# Patient Record
Sex: FEMALE | Race: WHITE | Employment: OTHER | URBAN - METROPOLITAN AREA
[De-identification: names, ages, dates, MRNs, and addresses within clinical notes are randomized per-mention and may not be internally consistent; named-entity substitution may affect disease eponyms.]

---

## 2019-11-11 NOTE — PROGRESS NOTES
Called to pre-assess for [facility] poss with Dr Tyler Tse , Scheduled 11/12/19. No answer & message left.

## 2019-11-12 ENCOUNTER — HOSPITAL ENCOUNTER (OUTPATIENT)
Dept: CARDIAC CATH/INVASIVE PROCEDURES | Age: 74
Discharge: HOME | End: 2019-11-13
Attending: INTERNAL MEDICINE | Admitting: INTERNAL MEDICINE
Payer: MEDICARE

## 2019-11-12 DIAGNOSIS — Z95.820 S/P PERCUTANEOUS TRANSLUMINAL ANGIOPLASTY (PTA) WITH STENT PLACEMENT: Primary | ICD-10-CM

## 2019-11-12 LAB
ACT BLD: 219 SECS (ref 70–128)
ACT BLD: 274 SECS (ref 70–128)
ANION GAP SERPL CALC-SCNC: 6 MMOL/L (ref 7–16)
ATRIAL RATE: 56 BPM
BUN SERPL-MCNC: 22 MG/DL (ref 8–23)
CALCIUM SERPL-MCNC: 9.2 MG/DL (ref 8.3–10.4)
CALCULATED P AXIS, ECG09: 48 DEGREES
CALCULATED R AXIS, ECG10: -29 DEGREES
CALCULATED T AXIS, ECG11: 58 DEGREES
CHLORIDE SERPL-SCNC: 112 MMOL/L (ref 98–107)
CO2 SERPL-SCNC: 25 MMOL/L (ref 21–32)
CREAT SERPL-MCNC: 1.19 MG/DL (ref 0.6–1)
DIAGNOSIS, 93000: NORMAL
ERYTHROCYTE [DISTWIDTH] IN BLOOD BY AUTOMATED COUNT: 15.7 % (ref 11.9–14.6)
GLUCOSE SERPL-MCNC: 87 MG/DL (ref 65–100)
HCT VFR BLD AUTO: 31.9 % (ref 35.8–46.3)
HGB BLD-MCNC: 9.7 G/DL (ref 11.7–15.4)
INR PPP: 1.2
MCH RBC QN AUTO: 26.3 PG (ref 26.1–32.9)
MCHC RBC AUTO-ENTMCNC: 30.4 G/DL (ref 31.4–35)
MCV RBC AUTO: 86.4 FL (ref 79.6–97.8)
NRBC # BLD: 0 K/UL (ref 0–0.2)
P-R INTERVAL, ECG05: 162 MS
PLATELET # BLD AUTO: 293 K/UL (ref 150–450)
PMV BLD AUTO: 10.3 FL (ref 9.4–12.3)
POTASSIUM SERPL-SCNC: 4 MMOL/L (ref 3.5–5.1)
PROTHROMBIN TIME: 15.8 SEC (ref 11.7–14.5)
Q-T INTERVAL, ECG07: 458 MS
QRS DURATION, ECG06: 160 MS
QTC CALCULATION (BEZET), ECG08: 441 MS
RBC # BLD AUTO: 3.69 M/UL (ref 4.05–5.2)
SODIUM SERPL-SCNC: 143 MMOL/L (ref 136–145)
VENTRICULAR RATE, ECG03: 56 BPM
WBC # BLD AUTO: 11.4 K/UL (ref 4.3–11.1)

## 2019-11-12 PROCEDURE — 93458 L HRT ARTERY/VENTRICLE ANGIO: CPT

## 2019-11-12 PROCEDURE — 85347 COAGULATION TIME ACTIVATED: CPT

## 2019-11-12 PROCEDURE — C1753 CATH, INTRAVAS ULTRASOUND: HCPCS

## 2019-11-12 PROCEDURE — 74011000250 HC RX REV CODE- 250: Performed by: NURSE PRACTITIONER

## 2019-11-12 PROCEDURE — 94760 N-INVAS EAR/PLS OXIMETRY 1: CPT

## 2019-11-12 PROCEDURE — C1725 CATH, TRANSLUMIN NON-LASER: HCPCS

## 2019-11-12 PROCEDURE — 99218 HC RM OBSERVATION: CPT

## 2019-11-12 PROCEDURE — 94640 AIRWAY INHALATION TREATMENT: CPT

## 2019-11-12 PROCEDURE — 77030004559 HC CATH ANGI DX SUPT CARD -B

## 2019-11-12 PROCEDURE — 74011250637 HC RX REV CODE- 250/637: Performed by: NURSE PRACTITIONER

## 2019-11-12 PROCEDURE — C1894 INTRO/SHEATH, NON-LASER: HCPCS

## 2019-11-12 PROCEDURE — 77030004534 HC CATH ANGI DX INFN CARD -A

## 2019-11-12 PROCEDURE — 74011636320 HC RX REV CODE- 636/320: Performed by: INTERNAL MEDICINE

## 2019-11-12 PROCEDURE — 93005 ELECTROCARDIOGRAM TRACING: CPT | Performed by: INTERNAL MEDICINE

## 2019-11-12 PROCEDURE — 92928 PRQ TCAT PLMT NTRAC ST 1 LES: CPT

## 2019-11-12 PROCEDURE — 74011000250 HC RX REV CODE- 250: Performed by: INTERNAL MEDICINE

## 2019-11-12 PROCEDURE — C1874 STENT, COATED/COV W/DEL SYS: HCPCS

## 2019-11-12 PROCEDURE — 74011250636 HC RX REV CODE- 250/636: Performed by: INTERNAL MEDICINE

## 2019-11-12 PROCEDURE — 93571 IV DOP VEL&/PRESS C FLO 1ST: CPT

## 2019-11-12 PROCEDURE — C1887 CATHETER, GUIDING: HCPCS

## 2019-11-12 PROCEDURE — 85027 COMPLETE CBC AUTOMATED: CPT

## 2019-11-12 PROCEDURE — C1760 CLOSURE DEV, VASC: HCPCS

## 2019-11-12 PROCEDURE — 77030004558 HC CATH ANGI DX SUPR TORQ CARD -A

## 2019-11-12 PROCEDURE — 77030013687 HC GD NDL BARD -B

## 2019-11-12 PROCEDURE — 99153 MOD SED SAME PHYS/QHP EA: CPT

## 2019-11-12 PROCEDURE — 92978 ENDOLUMINL IVUS OCT C 1ST: CPT

## 2019-11-12 PROCEDURE — 85610 PROTHROMBIN TIME: CPT

## 2019-11-12 PROCEDURE — C1769 GUIDE WIRE: HCPCS

## 2019-11-12 PROCEDURE — 74011250637 HC RX REV CODE- 250/637: Performed by: INTERNAL MEDICINE

## 2019-11-12 PROCEDURE — 77010033678 HC OXYGEN DAILY

## 2019-11-12 PROCEDURE — 99152 MOD SED SAME PHYS/QHP 5/>YRS: CPT

## 2019-11-12 PROCEDURE — 80048 BASIC METABOLIC PNL TOTAL CA: CPT

## 2019-11-12 RX ORDER — BUDESONIDE 0.5 MG/2ML
500 INHALANT ORAL
Status: DISCONTINUED | OUTPATIENT
Start: 2019-11-12 | End: 2019-11-13 | Stop reason: HOSPADM

## 2019-11-12 RX ORDER — ERGOCALCIFEROL 1.25 MG/1
50000 CAPSULE ORAL
COMMUNITY

## 2019-11-12 RX ORDER — GABAPENTIN 300 MG/1
600 CAPSULE ORAL
Status: DISCONTINUED | OUTPATIENT
Start: 2019-11-12 | End: 2019-11-13 | Stop reason: HOSPADM

## 2019-11-12 RX ORDER — MIDAZOLAM HYDROCHLORIDE 1 MG/ML
.5-2 INJECTION, SOLUTION INTRAMUSCULAR; INTRAVENOUS
Status: DISCONTINUED | OUTPATIENT
Start: 2019-11-12 | End: 2019-11-12 | Stop reason: HOSPADM

## 2019-11-12 RX ORDER — PANTOPRAZOLE SODIUM 40 MG/1
40 TABLET, DELAYED RELEASE ORAL
Status: DISCONTINUED | OUTPATIENT
Start: 2019-11-13 | End: 2019-11-13 | Stop reason: HOSPADM

## 2019-11-12 RX ORDER — FLECAINIDE ACETATE 100 MG/1
50 TABLET ORAL 2 TIMES DAILY
Status: DISCONTINUED | OUTPATIENT
Start: 2019-11-12 | End: 2019-11-12

## 2019-11-12 RX ORDER — LIDOCAINE HYDROCHLORIDE 10 MG/ML
10-30 INJECTION INFILTRATION; PERINEURAL ONCE
Status: COMPLETED | OUTPATIENT
Start: 2019-11-12 | End: 2019-11-12

## 2019-11-12 RX ORDER — ACETAMINOPHEN 325 MG/1
650 TABLET ORAL
Status: DISCONTINUED | OUTPATIENT
Start: 2019-11-12 | End: 2019-11-13 | Stop reason: HOSPADM

## 2019-11-12 RX ORDER — ARFORMOTEROL TARTRATE 15 UG/2ML
15 SOLUTION RESPIRATORY (INHALATION)
COMMUNITY
End: 2022-03-30

## 2019-11-12 RX ORDER — BUDESONIDE 0.5 MG/2ML
500 INHALANT ORAL 2 TIMES DAILY
COMMUNITY
End: 2022-03-30

## 2019-11-12 RX ORDER — HEPARIN SODIUM 10000 [USP'U]/ML
2000 INJECTION, SOLUTION INTRAVENOUS; SUBCUTANEOUS
Status: DISCONTINUED | OUTPATIENT
Start: 2019-11-12 | End: 2019-11-12 | Stop reason: HOSPADM

## 2019-11-12 RX ORDER — LEVOTHYROXINE SODIUM 75 UG/1
75 TABLET ORAL
COMMUNITY
End: 2022-03-30

## 2019-11-12 RX ORDER — METOPROLOL SUCCINATE 25 MG/1
25 TABLET, EXTENDED RELEASE ORAL DAILY
Status: DISCONTINUED | OUTPATIENT
Start: 2019-11-13 | End: 2019-11-13 | Stop reason: HOSPADM

## 2019-11-12 RX ORDER — SODIUM CHLORIDE 9 MG/ML
75 INJECTION, SOLUTION INTRAVENOUS CONTINUOUS
Status: DISCONTINUED | OUTPATIENT
Start: 2019-11-12 | End: 2019-11-13 | Stop reason: HOSPADM

## 2019-11-12 RX ORDER — ALBUTEROL SULFATE 90 UG/1
2 AEROSOL, METERED RESPIRATORY (INHALATION)
COMMUNITY

## 2019-11-12 RX ORDER — MONTELUKAST SODIUM 10 MG/1
10 TABLET ORAL
Status: DISCONTINUED | OUTPATIENT
Start: 2019-11-12 | End: 2019-11-13 | Stop reason: HOSPADM

## 2019-11-12 RX ORDER — HEPARIN SODIUM 200 [USP'U]/100ML
3 INJECTION, SOLUTION INTRAVENOUS CONTINUOUS
Status: DISCONTINUED | OUTPATIENT
Start: 2019-11-12 | End: 2019-11-12 | Stop reason: HOSPADM

## 2019-11-12 RX ORDER — ROPINIROLE 0.5 MG/1
0.5 TABLET, FILM COATED ORAL
COMMUNITY
End: 2020-04-15

## 2019-11-12 RX ORDER — ROPINIROLE 0.5 MG/1
0.5 TABLET, FILM COATED ORAL
Status: DISCONTINUED | OUTPATIENT
Start: 2019-11-12 | End: 2019-11-13 | Stop reason: HOSPADM

## 2019-11-12 RX ORDER — ALENDRONATE SODIUM 70 MG/1
70 TABLET ORAL
COMMUNITY
End: 2020-11-04

## 2019-11-12 RX ORDER — SIMVASTATIN 10 MG/1
20 TABLET, FILM COATED ORAL
Status: DISCONTINUED | OUTPATIENT
Start: 2019-11-12 | End: 2019-11-13 | Stop reason: HOSPADM

## 2019-11-12 RX ORDER — LABETALOL HYDROCHLORIDE 5 MG/ML
20 INJECTION, SOLUTION INTRAVENOUS
Status: DISCONTINUED | OUTPATIENT
Start: 2019-11-12 | End: 2019-11-13 | Stop reason: HOSPADM

## 2019-11-12 RX ORDER — BACLOFEN 10 MG/1
10 TABLET ORAL DAILY PRN
Status: DISCONTINUED | OUTPATIENT
Start: 2019-11-12 | End: 2019-11-13 | Stop reason: HOSPADM

## 2019-11-12 RX ORDER — SODIUM CHLORIDE 0.9 % (FLUSH) 0.9 %
5-40 SYRINGE (ML) INJECTION AS NEEDED
Status: DISCONTINUED | OUTPATIENT
Start: 2019-11-12 | End: 2019-11-13 | Stop reason: HOSPADM

## 2019-11-12 RX ORDER — ALBUTEROL SULFATE 0.83 MG/ML
2.5 SOLUTION RESPIRATORY (INHALATION)
Status: DISCONTINUED | OUTPATIENT
Start: 2019-11-12 | End: 2019-11-13 | Stop reason: HOSPADM

## 2019-11-12 RX ORDER — HYDROXYZINE HYDROCHLORIDE 10 MG/1
10 TABLET, FILM COATED ORAL
COMMUNITY
End: 2020-05-22

## 2019-11-12 RX ORDER — ALBUTEROL SULFATE 0.83 MG/ML
2.5 SOLUTION RESPIRATORY (INHALATION)
Status: COMPLETED | OUTPATIENT
Start: 2019-11-12 | End: 2019-11-12

## 2019-11-12 RX ORDER — ONDANSETRON 2 MG/ML
4 INJECTION INTRAMUSCULAR; INTRAVENOUS
Status: DISCONTINUED | OUTPATIENT
Start: 2019-11-12 | End: 2019-11-13 | Stop reason: HOSPADM

## 2019-11-12 RX ORDER — LEVOTHYROXINE SODIUM 75 UG/1
75 TABLET ORAL
Status: DISCONTINUED | OUTPATIENT
Start: 2019-11-13 | End: 2019-11-13 | Stop reason: HOSPADM

## 2019-11-12 RX ORDER — NITROGLYCERIN 0.4 MG/1
0.4 TABLET SUBLINGUAL
Status: DISCONTINUED | OUTPATIENT
Start: 2019-11-12 | End: 2019-11-13 | Stop reason: HOSPADM

## 2019-11-12 RX ORDER — GUAIFENESIN 100 MG/5ML
81-324 LIQUID (ML) ORAL ONCE
Status: COMPLETED | OUTPATIENT
Start: 2019-11-12 | End: 2019-11-12

## 2019-11-12 RX ORDER — MAG HYDROX/ALUMINUM HYD/SIMETH 200-200-20
30 SUSPENSION, ORAL (FINAL DOSE FORM) ORAL
Status: DISCONTINUED | OUTPATIENT
Start: 2019-11-12 | End: 2019-11-13 | Stop reason: HOSPADM

## 2019-11-12 RX ORDER — FENTANYL CITRATE 50 UG/ML
25-75 INJECTION, SOLUTION INTRAMUSCULAR; INTRAVENOUS
Status: DISCONTINUED | OUTPATIENT
Start: 2019-11-12 | End: 2019-11-12 | Stop reason: HOSPADM

## 2019-11-12 RX ORDER — CLOPIDOGREL BISULFATE 75 MG/1
75 TABLET ORAL DAILY
Status: DISCONTINUED | OUTPATIENT
Start: 2019-11-13 | End: 2019-11-13 | Stop reason: HOSPADM

## 2019-11-12 RX ORDER — DABIGATRAN ETEXILATE 150 MG/1
150 CAPSULE ORAL EVERY 12 HOURS
Status: DISCONTINUED | OUTPATIENT
Start: 2019-11-13 | End: 2019-11-13 | Stop reason: HOSPADM

## 2019-11-12 RX ORDER — CLOPIDOGREL BISULFATE 75 MG/1
300 TABLET ORAL ONCE
Status: COMPLETED | OUTPATIENT
Start: 2019-11-12 | End: 2019-11-12

## 2019-11-12 RX ORDER — ALLOPURINOL 100 MG/1
100 TABLET ORAL
COMMUNITY

## 2019-11-12 RX ORDER — SODIUM CHLORIDE 0.9 % (FLUSH) 0.9 %
5-40 SYRINGE (ML) INJECTION EVERY 8 HOURS
Status: DISCONTINUED | OUTPATIENT
Start: 2019-11-12 | End: 2019-11-13 | Stop reason: HOSPADM

## 2019-11-12 RX ADMIN — Medication 5 ML: at 16:38

## 2019-11-12 RX ADMIN — MIDAZOLAM 1 MG: 1 INJECTION INTRAMUSCULAR; INTRAVENOUS at 12:44

## 2019-11-12 RX ADMIN — SIMVASTATIN 20 MG: 10 TABLET, FILM COATED ORAL at 21:41

## 2019-11-12 RX ADMIN — ALBUTEROL SULFATE 2.5 MG: 2.5 SOLUTION RESPIRATORY (INHALATION) at 11:27

## 2019-11-12 RX ADMIN — FENTANYL CITRATE 25 MCG: 50 INJECTION, SOLUTION INTRAMUSCULAR; INTRAVENOUS at 13:03

## 2019-11-12 RX ADMIN — MIDAZOLAM 1 MG: 1 INJECTION INTRAMUSCULAR; INTRAVENOUS at 13:03

## 2019-11-12 RX ADMIN — SODIUM CHLORIDE 75 ML/HR: 900 INJECTION, SOLUTION INTRAVENOUS at 10:14

## 2019-11-12 RX ADMIN — HEPARIN SODIUM 6000 UNITS: 10000 INJECTION INTRAVENOUS; SUBCUTANEOUS at 12:50

## 2019-11-12 RX ADMIN — CLOPIDOGREL BISULFATE 300 MG: 75 TABLET, FILM COATED ORAL at 21:41

## 2019-11-12 RX ADMIN — MONTELUKAST 10 MG: 10 TABLET, FILM COATED ORAL at 21:41

## 2019-11-12 RX ADMIN — MIDAZOLAM 2 MG: 1 INJECTION INTRAMUSCULAR; INTRAVENOUS at 12:34

## 2019-11-12 RX ADMIN — LIDOCAINE HYDROCHLORIDE 8 ML: 10 INJECTION, SOLUTION INFILTRATION; PERINEURAL at 12:45

## 2019-11-12 RX ADMIN — IOPAMIDOL 190 ML: 755 INJECTION, SOLUTION INTRAVENOUS at 13:30

## 2019-11-12 RX ADMIN — MIDAZOLAM 2 MG: 1 INJECTION INTRAMUSCULAR; INTRAVENOUS at 13:29

## 2019-11-12 RX ADMIN — ASPIRIN 81 MG 324 MG: 81 TABLET ORAL at 10:14

## 2019-11-12 RX ADMIN — NITROGLYCERIN 0.2 MCG: 200 INJECTION, SOLUTION INTRAVENOUS at 13:02

## 2019-11-12 RX ADMIN — NITROGLYCERIN 1 INCH: 20 OINTMENT TOPICAL at 13:34

## 2019-11-12 RX ADMIN — HEPARIN SODIUM 4000 UNITS: 10000 INJECTION INTRAVENOUS; SUBCUTANEOUS at 13:00

## 2019-11-12 RX ADMIN — HEPARIN SODIUM 3 UNITS/HR: 200 INJECTION, SOLUTION INTRAVENOUS at 12:19

## 2019-11-12 RX ADMIN — Medication 5 ML: at 21:42

## 2019-11-12 RX ADMIN — LIDOCAINE HYDROCHLORIDE 3 ML: 10 INJECTION, SOLUTION INFILTRATION; PERINEURAL at 12:30

## 2019-11-12 RX ADMIN — FENTANYL CITRATE 50 MCG: 50 INJECTION, SOLUTION INTRAMUSCULAR; INTRAVENOUS at 12:34

## 2019-11-12 RX ADMIN — GABAPENTIN 600 MG: 300 CAPSULE ORAL at 21:41

## 2019-11-12 RX ADMIN — BUDESONIDE 500 MCG: 0.5 INHALANT RESPIRATORY (INHALATION) at 21:20

## 2019-11-12 RX ADMIN — FENTANYL CITRATE 25 MCG: 50 INJECTION, SOLUTION INTRAMUSCULAR; INTRAVENOUS at 12:45

## 2019-11-12 RX ADMIN — HEPARIN SODIUM 2000 UNITS: 10000 INJECTION INTRAVENOUS; SUBCUTANEOUS at 13:12

## 2019-11-12 RX ADMIN — TICAGRELOR 180 MG: 90 TABLET ORAL at 13:23

## 2019-11-12 NOTE — PROGRESS NOTES
TRANSFER - IN REPORT:    Verbal report received from [address] [place] [address] on [facility] being received from [facility] for routine progression of care. Report consisted of patients Situation, Background, Assessment and Recommendations(SBAR). Information from the following report(s) SBAR, Kardex, OR Summary, MAR, Recent Results and Cardiac Rhythm NSR was reviewed. Opportunity for questions and clarification was provided. Assessment completed upon patients arrival to unit and care assumed. Patient received to room 327. Patient connected to monitor and assessment completed. Plan of care reviewed. Patient oriented to room and call light. Patient aware to use call light to communicate any chest pain or needs. Admission skin assessment completed with second RN and reveals the following: Patient has a right radial site s/p LHC. CDI with gauze and tegaderm. Patients skin is pale with scattered bruising on the BUE. Sacrum is free of any breakdown. Heels are free of bogginess. No other skin abnormalities noted.

## 2019-11-12 NOTE — PROGRESS NOTES
Patient received to [address] [place] [address] room # 4 via wheelchair from [facility]. Patient scheduled for [facility] today with Dr Hendricks Baumgarten. Procedure reviewed & questions answered, voiced good understanding consent obtained & placed on chart. All medications and medical history reviewed. Will prep patient per orders. Patient & family updated on plan of care. The patient has a fraility score of 4-VULNERABLE, based on patient does require wheelchair assistance to prep room, patient A&Ox3.

## 2019-11-12 NOTE — PROGRESS NOTES
Bedside and Verbal shift change report given to Sara (oncoming nurse) by self (offgoing nurse). Report included the following information SBAR, Kardex, MAR and Recent Results.      Right groin site CDI

## 2019-11-12 NOTE — PROCEDURES
[facility]  CARDIAC CATH    Name:  Nithya Mcguire  MR#:  [id number]  :  1945  ACCOUNT #:  [de-identified]  DATE OF SERVICE:  2019    PROCEDURES PERFORMED:  1. Left heart catheterization, selective coronary arteriography, and left ventriculogram via the right femoral approach. 2.  Attempted right radial access. 3.  iFR of LAD. 4.  PCI and stenting of the LAD. 5.  Intravascular ultrasound of the LAD following stent placement. PREOPERATIVE DIAGNOSES:  Typical angina with worsening symptoms. Abnormal stress test considered an intermediate risk stress test.    POSTOPERATIVE DIAGNOSIS:  Obstructive coronary artery disease. SURGEON:  Leopoldo Manges, MD    ASSISTANT:  None. ESTIMATED BLOOD LOSS:  Less than 5 mL. SPECIMENS REMOVED:  None. COMPLICATIONS:  None. IMPLANTS:  Resolute [place] drug-eluting stent. ANESTHESIA:  The patient received moderate supervised conscious sedation with a total of 4 mg of Versed and 100 mcg of fentanyl. Sedation was administered by Jai Malcolm RN, under my supervision. Sedation start time was 1235 with a procedure completion time of 1330. FINDINGS:  As below. TECHNICAL FACTORS:  After informed consent was obtained, the patient was brought to the cardiac catheterization lab. The right radial artery was prepped and draped in the usual sterile fashion. I was able to enter the right radial artery but could not get a wire the pass despite two different punctures. A small-caliber radial artery. At this point, it was felt best to proceed with femoral approach. The right femoral artery was prepped and draped in usual sterile fashion. Using [facility] ultrasound, the right common femoral artery was identified. A 6-Vietnamese arterial sheath was placed without difficulty.   A JL4 catheter was used to select and engage the ostium of the left main coronary artery and a 3DRC catheter was used to select and engage the ostium of the right coronary artery. Selective injection verification was performed. A pigtail catheter was used to cross the aortic valve and the left ventricle. Hemodynamic measurements and left ventriculogram were obtained. Left ventricular aortic pressure gradient was obtained by pullback technique. At the conclusion of the diagnostic procedure, the patient was referred for [facility] and later PCI of the LAD. Please see procedure notes for details. CONTRAST:  Isovue 190. HEMODYNAMIC RESULTS:  1. Aortic pressure was 167/78 with a mean of 112. She did become hypertensive during the procedure requiring intracoronary nitroglycerin. 2.  Left ventricular end-diastolic pressure is 29.  3.  There is a 10-mm peak-to-peak gradient across the aortic valve consistent with mild aortic stenosis. ANGIOGRAPHIC RESULTS:  1. Left main coronary artery:  Large-caliber vessel. Angiographically normal.  2.  LAD:  Medium to large-caliber nondominant vessel. Contains a 30% ostial stenosis. The ongoing vessel is small caliber but patent. 3.  First obtuse marginal:  It is a medium-caliber vessel. Patent. 4. LAD:  It is a medium to large-caliber vessel. Heavily calcified in the proximal segment with an eccentric 70% stenosis. Distal vessel is patent. 5.  First and second diagonal arteries:  Small-caliber vessels. Patent. 6.  Right coronary artery: It is a large-caliber, dominant vessel. A 20% mid and 20% distal stenosis. 7.  Right PDA:  Medium-caliber vessel. A 30% mid stenosis. 8.  Right posterolateral branch:  Medium-caliber vessel. Patent. 9.  Left ventriculogram performed in VIDALES projection shows low-normal left ventricular systolic function. EF 50% to 55%. No focal segmental wall motion abnormalities. Aortic root is nondilated. CONCLUSION:  1. Eccentric proximal LAD stenosis. 2.  Mild stenosis in the circumflex and right coronary artery. 3.  Mild aortic stenosis.   4.  Low-normal left ventricular systolic function. 5.  Poorly controlled hypertension with elevated left ventricular end-diastolic pressure. PLAN:  Proceed with hemodynamic assessment of the LAD. PERCUTANEOUS CORONARY INTERVENTION NOTE:  PROCEDURE #1:  iFR of LAD. TECHNICAL FACTORS:  The patient was given intravenous heparin to maintain ACT greater than 300. XB3.0 guide was used to select and engage the ostium of the left main coronary artery. A Verrata pressure wire was advanced into the ostium of the left main coronary artery and appropriately normalized. The patient was given 200 mcg of intracoronary nitroglycerin and the iFR was placed in the mid to distal LAD.  iFR was 0.79-0.80 indicating hemodynamically significant stenosis. Pullback showed that there was diffuse hemodynamic significance across the proximal LAD. Based on this, it was felt PCI was required. PROCEDURE #2:  PCI of the proximal LAD, pre-stenosis 70%, post stenosis 0%. TECHNICAL FACTORS:  A [place] wire was placed in the distal LAD. The lesion was predilated with a 3.0 x 20-mm NC Quantum [place] balloon to 16 atmospheres. Following this, a Resolute [place] 3.5 x 38-mm drug-eluting stent was positioned and deployed to 18 atmospheres. At this point, a 3.5 x 15-mm NC Quantum [place] was positioned and deployed to 20 atmospheres for appropriate post dilatation. Following post dilatation, intravascular ultrasound was performed that showed there is no evidence of dissection pre or post stent. Throughout the stented segment, there is good apposition with no incomplete apposition seen. PROCEDURE #3:  Angio-Seal vascular closure. TECHNICAL FACTORS:  A femoral arteriogram was performed via the existing 6-Nigerian sheath. This showed the sheath was contained in the right common femoral artery. A 6-Nigerian Angio-Seal vascular closure device was deployed by standard technique with good hemostasis. CONCLUSIONS:  1.   Successful iFR of the proximal LAD with iFR of 0.79-0.80 indicating hemodynamically significant stenosis of proximal LAD. 2.  Successful PCI and stenting of proximal LAD with a Resolute [place] 3.5 x 38-mm drug-eluting stent. 3.  Successful intravascular ultrasound post stenting of the proximal LAD. 4.  Successful 6-[place] Irish Angio-Seal vascular closure device deployment with good hemostasis. PLAN:  Monitor the patient closely in the postprocedure setting. She is admitted to observation given her significant hypertension during the procedure to monitor for any complications at her arteriotomy site.       Karan Rudolph MD      [id number]  D:  11/12/2019 14:30  T:  11/12/2019 15:15  JOB #:  [id number]  CC:  Brian Vann MD       [facility] Cardiology

## 2019-11-12 NOTE — PROGRESS NOTES
TRANSFER - IN REPORT:    Verbal report received from bonny RODRIGEZ(name) on Gabby Nielson  being received from cath lab(unit) for routine progression of care      Report consisted of patients Situation, Background, Assessment and   Recommendations(SBAR). Information from the following report(s) Procedure Summary was reviewed with the receiving nurse. Opportunity for questions and clarification was provided. Assessment completed upon patients arrival to unit and care assumed.

## 2019-11-12 NOTE — PROCEDURES
Brief Cardiac Procedure Note    Patient: Talia Pulido MRN: [id number]  SSN: [id number]    YOB: 1945  Age: 72 y.o. Sex: female      Date of Procedure: 11/12/2019     Pre-procedure Diagnosis: Typical Angina    Post-procedure Diagnosis: Coronary Artery Disease    Procedure: Left Heart Catheterization with Percutaneous Coronary Intervention    Brief Description of Procedure: As above    Performed By: Caren Pires MD     Assistants: None    Anesthesia: Moderate Sedation    Estimated Blood Loss: Less than 10 mL      Specimens: None    Implants: None    Findings: 70% pLAD. IFR 0.80. PCI with Nikhil 3.5 x 38 mm ISABELL. Post stent IVUS good. Complications: None, RFA. Mary Oh observation due to htn. Recommendations: Continue medical therapy.     Signed By: Caren Pires MD     November 12, 2019

## 2019-11-12 NOTE — PROGRESS NOTES
Patient transported to room 327. Right groin assessed with Ousmane Alvarado RN. No bleeding or hematoma.

## 2019-11-12 NOTE — PROGRESS NOTES
TRANSFER - OUT REPORT:    Verbal report given to Mala RN(name) on Tony Rich  being transferred to tele 327(unit) for routine progression of care       Report consisted of patients Situation, Background, Assessment and   Recommendations(SBAR). Information from the following report(s) Procedure Summary was reviewed with the receiving nurse. Lines:   Peripheral IV 11/12/19 Left;Posterior Hand (Active)        Opportunity for questions and clarification was provided.       Patient transported with:   Registered Nurse

## 2019-11-12 NOTE — PROGRESS NOTES
TRANSFER - OUT REPORT:    Verbal report given to ivory rn(name) on Gabby Hardy  being transferred to cpru(unit) for routine progression of care       Report consisted of patients Situation, Background, Assessment and   Recommendations(SBAR). Information from the following report(s) SBAR was reviewed with the receiving nurse. Opportunity for questions and clarification was provided. Procedure: [facility]    Finding Summary: stent to lad(cath/pci/pacer settings)  Location: right groin    Closure Device: angioseal(yes/no/description)  Post Site Assessment: no bleeding or hematoma     Pre Procedure Meds:(if any)      Intra Procedure Meds:    Versed: 6mg  Fentanyl: 100mcg  Heparin: 14,000 units  Angiomax Stop Time: na  Reopro:  na  Integrelin: na  Antiplatelet: brilinta 436BI             Peripheral IV 11/12/19 Left;Posterior Hand (Active)        Post-Procedure Site Assessment (1)  Wound Type: Catheter entry/exit  Location: Groin  Orientation : Right  Closure Device: Tegaderm, Angioseal  Site Assessment: No bleeding, No hematoma, Dry/intact                       is allergic to amoxicillin; codeine; hydralazine; and macrobid [nitrofurantoin monohyd/m-cryst].     Past Medical History:   Diagnosis Date    Abnormal stress test 2006    Arthritis     AS (aortic stenosis)     Asthma 11/12/2015    Atrial septal aneurysm     CAD (coronary artery disease)     Chest pain 11/12/2015    Chronic kidney disease     Chronic venous insufficiency     COPD (chronic obstructive pulmonary disease) ([facility] 11/12/2015    Diabetes ([facility]     Essential hypertension 11/12/2015    History of left heart catheterization (LHC) 1990s     per pt - normal    Hyperlipidemia 11/12/2015    Mixed hyperlipidemia 11/12/2015    PAF (paroxysmal atrial fibrillation) (HCC) 11/12/2015    Palpitations 11/12/2015    Renal insufficiency     Sleep apnea 11/12/2015    w CPAP    Thyroid disease      Visit Vitals  /81   Pulse 62 Resp 16   Ht 5' 3\" (1.6 m)   Wt 116.6 kg (257 lb)   SpO2 95%   BMI 45.53 kg/m²

## 2019-11-12 NOTE — PROGRESS NOTES
Care Management Interventions  PCP Verified by CM: Yes  Last Visit to PCP: 10/29/19  Mode of Transport at Discharge: Other (see comment)(Jaiden Hardy Spouse [phone] )  Transition of Care Consult (CM Consult): Discharge Planning  Discharge Durable Medical Equipment: No  Physical Therapy Consult: No  Occupational Therapy Consult: No  Speech Therapy Consult: No  Current Support Network: Lives with Spouse  Confirm Follow Up Transport: Family  Plan discussed with Pt/Family/Caregiver: Yes  Freedom of Choice Offered: Yes  Discharge Location  Discharge Placement: Home    Pt admitted to 3rd floor [facility] for precordial pain . CM met with pt to discuss CM needs & DCP. Pt is A&Ox4. Pt is indep at home with all ADLS. Pt lives with spouse, grandson, and son. Pt has cane, walker, SC, cpap, & nebulizer; no further DME needs. Pt has no difficulty with obtaining medications or transport. DCP home with spouse. No further needs noted.  CM to continue to monitor.  '

## 2019-11-13 VITALS
DIASTOLIC BLOOD PRESSURE: 77 MMHG | RESPIRATION RATE: 18 BRPM | WEIGHT: 259.2 LBS | OXYGEN SATURATION: 96 % | BODY MASS INDEX: 45.93 KG/M2 | TEMPERATURE: 98.4 F | SYSTOLIC BLOOD PRESSURE: 156 MMHG | HEIGHT: 63 IN | HEART RATE: 63 BPM

## 2019-11-13 LAB
ANION GAP SERPL CALC-SCNC: 6 MMOL/L (ref 7–16)
ATRIAL RATE: 62 BPM
ATRIAL RATE: 81 BPM
BASOPHILS # BLD: 0.1 K/UL (ref 0–0.2)
BASOPHILS NFR BLD: 0 % (ref 0–2)
BUN SERPL-MCNC: 19 MG/DL (ref 8–23)
CALCIUM SERPL-MCNC: 8.7 MG/DL (ref 8.3–10.4)
CALCULATED P AXIS, ECG09: 116 DEGREES
CALCULATED P AXIS, ECG09: 65 DEGREES
CALCULATED R AXIS, ECG10: -12 DEGREES
CALCULATED R AXIS, ECG10: -34 DEGREES
CALCULATED T AXIS, ECG11: 137 DEGREES
CALCULATED T AXIS, ECG11: 70 DEGREES
CHLORIDE SERPL-SCNC: 109 MMOL/L (ref 98–107)
CHOLEST SERPL-MCNC: 99 MG/DL
CO2 SERPL-SCNC: 26 MMOL/L (ref 21–32)
CREAT SERPL-MCNC: 1.21 MG/DL (ref 0.6–1)
DIAGNOSIS, 93000: NORMAL
DIAGNOSIS, 93000: NORMAL
DIFFERENTIAL METHOD BLD: ABNORMAL
EOSINOPHIL # BLD: 0.5 K/UL (ref 0–0.8)
EOSINOPHIL NFR BLD: 4 % (ref 0.5–7.8)
ERYTHROCYTE [DISTWIDTH] IN BLOOD BY AUTOMATED COUNT: 15.5 % (ref 11.9–14.6)
GLUCOSE SERPL-MCNC: 99 MG/DL (ref 65–100)
HCT VFR BLD AUTO: 31.5 % (ref 35.8–46.3)
HDLC SERPL-MCNC: 50 MG/DL (ref 40–60)
HDLC SERPL: 2 {RATIO}
HGB BLD-MCNC: 9.6 G/DL (ref 11.7–15.4)
IMM GRANULOCYTES # BLD AUTO: 0.1 K/UL (ref 0–0.5)
IMM GRANULOCYTES NFR BLD AUTO: 0 % (ref 0–5)
LDLC SERPL CALC-MCNC: 34.4 MG/DL
LIPID PROFILE,FLP: NORMAL
LYMPHOCYTES # BLD: 2.3 K/UL (ref 0.5–4.6)
LYMPHOCYTES NFR BLD: 19 % (ref 13–44)
MAGNESIUM SERPL-MCNC: 1.8 MG/DL (ref 1.8–2.4)
MCH RBC QN AUTO: 25.7 PG (ref 26.1–32.9)
MCHC RBC AUTO-ENTMCNC: 30.5 G/DL (ref 31.4–35)
MCV RBC AUTO: 84.2 FL (ref 79.6–97.8)
MONOCYTES # BLD: 0.7 K/UL (ref 0.1–1.3)
MONOCYTES NFR BLD: 6 % (ref 4–12)
NEUTS SEG # BLD: 8.4 K/UL (ref 1.7–8.2)
NEUTS SEG NFR BLD: 70 % (ref 43–78)
NRBC # BLD: 0 K/UL (ref 0–0.2)
P-R INTERVAL, ECG05: 156 MS
P-R INTERVAL, ECG05: 166 MS
PLATELET # BLD AUTO: 286 K/UL (ref 150–450)
PMV BLD AUTO: 10.2 FL (ref 9.4–12.3)
POTASSIUM SERPL-SCNC: 4.2 MMOL/L (ref 3.5–5.1)
Q-T INTERVAL, ECG07: 416 MS
Q-T INTERVAL, ECG07: 434 MS
QRS DURATION, ECG06: 148 MS
QRS DURATION, ECG06: 160 MS
QTC CALCULATION (BEZET), ECG08: 440 MS
QTC CALCULATION (BEZET), ECG08: 483 MS
RBC # BLD AUTO: 3.74 M/UL (ref 4.05–5.2)
SODIUM SERPL-SCNC: 141 MMOL/L (ref 136–145)
TRIGL SERPL-MCNC: 73 MG/DL (ref 35–150)
VENTRICULAR RATE, ECG03: 62 BPM
VENTRICULAR RATE, ECG03: 81 BPM
VLDLC SERPL CALC-MCNC: 14.6 MG/DL (ref 6–23)
WBC # BLD AUTO: 12 K/UL (ref 4.3–11.1)

## 2019-11-13 PROCEDURE — 94760 N-INVAS EAR/PLS OXIMETRY 1: CPT

## 2019-11-13 PROCEDURE — 85025 COMPLETE CBC W/AUTO DIFF WBC: CPT

## 2019-11-13 PROCEDURE — 83735 ASSAY OF MAGNESIUM: CPT

## 2019-11-13 PROCEDURE — 74011250637 HC RX REV CODE- 250/637: Performed by: INTERNAL MEDICINE

## 2019-11-13 PROCEDURE — 93005 ELECTROCARDIOGRAM TRACING: CPT | Performed by: INTERNAL MEDICINE

## 2019-11-13 PROCEDURE — 74011000250 HC RX REV CODE- 250: Performed by: NURSE PRACTITIONER

## 2019-11-13 PROCEDURE — 74011250637 HC RX REV CODE- 250/637: Performed by: NURSE PRACTITIONER

## 2019-11-13 PROCEDURE — 80048 BASIC METABOLIC PNL TOTAL CA: CPT

## 2019-11-13 PROCEDURE — 36415 COLL VENOUS BLD VENIPUNCTURE: CPT

## 2019-11-13 PROCEDURE — 80061 LIPID PANEL: CPT

## 2019-11-13 PROCEDURE — 99218 HC RM OBSERVATION: CPT

## 2019-11-13 RX ORDER — CLOPIDOGREL BISULFATE 75 MG/1
75 TABLET ORAL DAILY
Qty: 30 TAB | Refills: 11 | Status: SHIPPED | OUTPATIENT
Start: 2019-11-13 | End: 2019-11-13

## 2019-11-13 RX ORDER — CLOPIDOGREL BISULFATE 75 MG/1
75 TABLET ORAL DAILY
Qty: 90 TAB | Refills: 3 | Status: SHIPPED | OUTPATIENT
Start: 2019-11-13 | End: 2020-05-22 | Stop reason: SDUPTHER

## 2019-11-13 RX ADMIN — PANTOPRAZOLE SODIUM 40 MG: 40 TABLET, DELAYED RELEASE ORAL at 08:22

## 2019-11-13 RX ADMIN — DRONEDARONE 400 MG: 400 TABLET, FILM COATED ORAL at 08:22

## 2019-11-13 RX ADMIN — Medication 10 ML: at 05:46

## 2019-11-13 RX ADMIN — HYDROCHLOROTHIAZIDE: 12.5 CAPSULE ORAL at 08:22

## 2019-11-13 RX ADMIN — CLOPIDOGREL BISULFATE 75 MG: 75 TABLET ORAL at 08:22

## 2019-11-13 RX ADMIN — LEVOTHYROXINE SODIUM 75 MCG: 75 TABLET ORAL at 08:22

## 2019-11-13 RX ADMIN — METOPROLOL SUCCINATE 25 MG: 25 TABLET, EXTENDED RELEASE ORAL at 08:22

## 2019-11-13 RX ADMIN — DABIGATRAN ETEXILATE MESYLATE 150 MG: 150 CAPSULE ORAL at 08:22

## 2019-11-13 RX ADMIN — BUDESONIDE 500 MCG: 0.5 INHALANT RESPIRATORY (INHALATION) at 08:10

## 2019-11-13 RX ADMIN — ALBUTEROL SULFATE 2.5 MG: 2.5 SOLUTION RESPIRATORY (INHALATION) at 08:14

## 2019-11-13 NOTE — PROGRESS NOTES
Bedside and Verbal shift change report received from [facility].  Report included the following information SBAR, Kardex, Procedure Summary, Intake/Output, MAR, Recent Results and Cardiac Rhythm SB.

## 2019-11-13 NOTE — PROGRESS NOTES
Cardiac Rehab: Spoke with patient regarding referral to cardiac rehab. Patient meets admission criteria based on PCI (11/12/19). Written information about Cardiac Rehab given and reviewed with patient. Discussed lifestyle modifications to promote cardiac wellness. Patient indicated that she wants to participate in the cardiac rehab program at the [facility] which is closer to her home in [place], [place]. We will forward her referral as requested. Her Cardiologist is Dr. Shanda Walter.       Thank you,  SUNDAY OsbornN, RN  Cardiopulmonary Rehabilitation Nurse Liaison  [facility]

## 2019-11-13 NOTE — PROGRESS NOTES
Discharge instructions and medications reviewed with patient. Patient educated on new medications, plavix and multaq. Patient verbalizes understanding. All questions answered. IV and monitor removed by primary RN.

## 2019-11-13 NOTE — DISCHARGE INSTRUCTIONS
Patient Education        Percutaneous Coronary Intervention: What to Expect at Home  Your Recovery    Percutaneous coronary intervention (PCI) is the name for procedures that are used to open a narrowed or blocked coronary artery. The two most common PCI procedures are coronary angioplasty and coronary stent placement. Your groin or arm may have a bruise and feel sore for a day or two after a percutaneous coronary intervention (PCI). You can do light activities around the house, but nothing strenuous for several days. This care sheet gives you a general idea about how long it will take for you to recover. But each person recovers at a different pace. Follow the steps below to get better as quickly as possible. How can you care for yourself at home? Activity    · If the doctor gave you a sedative:  ? For 24 hours, don't do anything that requires attention to detail, such as going to work, making important decisions, or signing any legal documents. It takes time for the medicine's effects to completely wear off.  ? For your safety, do not drive or operate any machinery that could be dangerous. Wait until the medicine wears off and you can think clearly and react easily.     · Do not do strenuous exercise and do not lift, pull, or push anything heavy until your doctor says it is okay. This may be for a day or two. You can walk around the house and do light activity, such as cooking.     · If the catheter was placed in your groin, try not to walk up stairs for the first couple of days.     · If the catheter was placed in your arm near your wrist, do not bend your wrist deeply for the first couple of days. Be careful using your hand to get into and out of a chair or bed.     · Carry your stent identification card with you at all times.     · If your doctor recommends it, get more exercise. Walking is a good choice. Bit by bit, increase the amount you walk every day.  Try for at least 30 minutes on most days of the week.   Diet    · Drink plenty of fluids to help your body flush out the dye. If you have kidney, heart, or liver disease and have to limit fluids, talk with your doctor before you increase the amount of fluids you drink.     · Keep eating a heart-healthy diet that has lots of fruits, vegetables, and whole grains. If you have not been eating this way, talk to your doctor. You also may want to talk to a dietitian. This expert can help you to learn about healthy foods and plan meals. Medicines    · Your doctor will tell you if and when you can restart your medicines. He or she will also give you instructions about taking any new medicines.     · If you take blood thinners, such as warfarin (Coumadin), clopidogrel (Plavix), or aspirin, be sure to talk to your doctor. He or she will tell you if and when to start taking those medicines again. Make sure that you understand exactly what your doctor wants you to do.     · Your doctor will prescribe blood-thinning medicines. You will likely take aspirin plus another antiplatelet, such as clopidogrel (Plavix). It is very important that you take these medicines exactly as directed. These medicines help keep the coronary artery open and reduce your risk of a heart attack.     · Call your doctor if you think you are having a problem with your medicine.    Care of the catheter site    · For 1 or 2 days, keep a bandage over the spot where the catheter was inserted. The bandage probably will fall off in this time.     · Put ice or a cold pack on the area for 10 to 20 minutes at a time to help with soreness or swelling. Put a thin cloth between the ice and your skin.     · You may shower 24 to 48 hours after the procedure, if your doctor okays it. Pat the incision dry.     · Do not soak the catheter site until it is healed. Don't take a bath for 1 week, or until your doctor tells you it is okay.     · Watch for bleeding from the site.  A small amount of blood (up to the size of a quarter) on the bandage can be normal.     · If you are bleeding, lie down and press on the area for 15 minutes to try to make it stop. If the bleeding does not stop, call your doctor or seek immediate medical care. Follow-up care is a key part of your treatment and safety. Be sure to make and go to all appointments, and call your doctor if you are having problems. It's also a good idea to know your test results and keep a list of the medicines you take. When should you call for help? Call 911 anytime you think you may need emergency care. For example, call if:    · You passed out (lost consciousness).     · You have severe trouble breathing.     · You have sudden chest pain and shortness of breath, or you cough up blood.     · You have symptoms of a heart attack, such as:  ? Chest pain or pressure. ? Sweating. ? Shortness of breath. ? Nausea or vomiting. ? Pain that spreads from the chest to the neck, jaw, or one or both shoulders or arms. ? Dizziness or lightheadedness. ? A fast or uneven pulse. After calling 911, chew 1 adult-strength aspirin. Wait for an ambulance. Do not try to drive yourself.     · You have been diagnosed with angina, and you have angina symptoms that do not go away with rest or are not getting better within 5 minutes after you take one dose of nitroglycerin.    Call your doctor now or seek immediate medical care if:    · You are bleeding from the area where the catheter was put in your artery.     · You have a fast-growing, painful lump at the catheter site.     · You have signs of infection, such as:  ? Increased pain, swelling, warmth, or redness. ? Red streaks leading from the catheter site. ? Pus draining from the catheter site. ? A fever.     · Your leg, arm, or hand is painful, looks blue, or feels cold, numb, or tingly.    Watch closely for changes in your health, and be sure to contact your doctor if you have any problems. Where can you learn more?   Go to http://xiomara-issac.info/. Enter A399 in the search box to learn more about \"Percutaneous Coronary Intervention: What to Expect at Home. \"  Current as of: April 9, 2019  Content Version: 12.2  © 7934-0924 [employer]. Care instructions adapted under license by [employer] (which disclaims liability or warranty for this information). If you have questions about a medical condition or this instruction, always ask your healthcare professional. [address] any warranty or liability for your use of this information. HEART CATHETERIZATION/ANGIOGRAPHY DISCHARGE INSTRUCTIONS    1. Check puncture site frequently for swelling or bleeding. If there is any bleeding, lie down and apply pressure over the area with a clean towel or washcloth. Notify your doctor for any redness, swelling, drainage, or oozing from the puncture site. Notify your doctor for any fever or chills. 2. If the extremity becomes cold, numb, or painful call [facility] at [phone].  3. Activity should be limited for the next 48 hours. Climb stairs as little as possible and avoid any stooping, bending, or strenuous activity for 48 hours. No heavy lifting (anything over 10 pounds) for 3 days. 4. You may resume your usual diet. Drink more fluids than usual.  5. Have a responsible person drive you home and stay with you for at least 24 hours after your heart catheterization/angiography. 6. You may remove bandage from your Right groin in 24 hours. You may shower in 24 hours. No tub baths, hot tubs, or swimming for 1 week. Do not place any lotions, creams, powders, or ointments over puncture site for 1 week. You may place a clean band-aid over the puncture site each day for 5 days. Change daily.     DISCHARGE SUMMARY from Nurse    PATIENT INSTRUCTIONS:    After general anesthesia or intravenous sedation, for 24 hours or while taking prescription Narcotics:  · Limit your activities  · Do not drive and operate hazardous machinery  · Do not make important personal or business decisions  · Do  not drink alcoholic beverages  · If you have not urinated within 8 hours after discharge, please contact your surgeon on call. Report the following to your surgeon:  · Excessive pain, swelling, redness or odor of or around the surgical area  · Temperature over 100.5  · Nausea and vomiting lasting longer than 4 hours or if unable to take medications  · Any signs of decreased circulation or nerve impairment to extremity: change in color, persistent  numbness, tingling, coldness or increase pain  · Any questions    What to do at Home:  Recommended activity: No heavy lifting, pushing, pulling with the implant side for 1 week. If you experience any of the following symptoms chest pain, shortness of breath, numbness, please follow up with MD.    *  Please give a list of your current medications to your Primary Care Provider. *  Please update this list whenever your medications are discontinued, doses are      changed, or new medications (including over-the-counter products) are added. *  Please carry medication information at all times in case of emergency situations. These are general instructions for a healthy lifestyle:    No smoking/ No tobacco products/ Avoid exposure to second hand smoke  Surgeon General's Warning:  Quitting smoking now greatly reduces serious risk to your health. Obesity, smoking, and sedentary lifestyle greatly increases your risk for illness    A healthy diet, regular physical exercise & weight monitoring are important for maintaining a healthy lifestyle    You may be retaining fluid if you have a history of heart failure or if you experience any of the following symptoms:  Weight gain of 3 pounds or more overnight or 5 pounds in a week, increased swelling in our hands or feet or shortness of breath while lying flat in bed.   Please call your doctor as soon as you notice any of these symptoms; do not wait until your next office visit. The discharge information has been reviewed with the patient. The patient verbalized understanding. Discharge medications reviewed with the patient and appropriate educational materials and side effects teaching were provided.   ___________________________________________________________________________________________________________________________________

## 2019-11-13 NOTE — PROGRESS NOTES
Verbal bedside report given to [facility], [facility], oncoming RN. Patient's situation, background, assessment and recommendations provided. Opportunity for questions provided. Oncoming RN assumed care of patient.

## 2019-11-13 NOTE — DISCHARGE SUMMARY
[facility] Cardiology Discharge Summary     Patient ID:  Lori Hardy  [id number]  30 y.o.  1945    Admit date: 11/12/2019    Discharge date:  11/13/2019    Admitting Physician: Alejandra Cormier MD     Discharge Physician: Dr. Isabel Haq    Admission Diagnoses: Precordial pain [R07.2]  Chest pain [R07.9]    Discharge Diagnoses:    Diagnosis    Diabetes [facility])    Renal insufficiency    Mixed hyperlipidemia    Essential hypertension    Hyperlipidemia    PAF (paroxysmal atrial fibrillation) [facility])       Cardiology Procedures this admission:  Left heart catheterization with PCI  Consults: None    Hospital Course: Patient was seen at the office of [facility] Cardiology by Dr. Monica Sarkar for complaints of chest pain and was subsequently scheduled for an AM Admission [facility] on 11/12/19. Patient underwent cardiac catheterization by Dr. Mel Fitzpatrick. Patient was found to have 70% stenosis of the pLAD with iFR of 0.79-0.80 that was stented with a 3.5 x 38-mm Resolute [place] ISABELL with 0% residual stenosis. Patient tolerated the procedure well and was taken to the telemetry floor for recovery. The morning of discharge, patient was up feeling well without any complaints of chest pain or shortness of breath. Patient's right groin cath site was clean, dry and intact without hematoma or bruit. Patient's labs were WNL. Patient was seen and examined by Dr. Isabel Haq and determined stable and ready for discharge. Patient was instructed on the importance of medication compliance including taking Pradaxa (for PAF) and Plavix everyday without missing a dose. No ASA due to increased bleeding risk with triple therapy. After receiving drug eluting stents, the patient will remain on dual anti-platelet therapy for 1 year. For maximized medical therapy for CAD, patient will continue ARB, BB and statin as well. The patient has PAF and her tambocor was switched to multaq d/t new dx of CAD.   The patient will follow up with [facility]iology -- Dr. Johan Jordan on 12/16/19 at 945 am in [facility] office. Patient has been referred to cardiac rehab. DISPOSITION: The patient is being discharged home in stable condition on a low saturated fat, low cholesterol and low salt diet. The patient is instructed to advance activities as tolerated to the limit of fatigue or shortness of breath. The patient is instructed to avoid all heavy lifting, straining, stooping or squatting for 3-5 days. The patient is instructed to watch the cath site for bleeding/oozing; if seen, the patient is instructed to apply firm pressure with a clean cloth and call [facility]iology at [phone]. The patient is instructed to watch for signs of infection which include: increasing area of redness, fever/hot to touch or purulent drainage at the catheterization site. The patient is instructed not to soak in a bathtub for 7-10 days, but is cleared to shower. The patient is instructed to call the office or return to the ER for immediate evaluation for any shortness of breath or chest pain not relieved by NTG. Discharge Exam: Patient has been seen by Dr. Amalia King: see his progress note for exam details.   B/P recheck 142/80  Visit Vitals  /80 (BP 1 Location: Left arm, BP Patient Position: At rest)   Pulse 61   Temp 98 °F (36.7 °C)   Resp 18   Ht 5' 3\" (1.6 m)   Wt 259 lb 3.2 oz (117.6 kg)   SpO2 97%   BMI 45.92 kg/m²       Recent Results (from the past 24 hour(s))   CBC W/O DIFF    Collection Time: 11/12/19  9:59 AM   Result Value Ref Range    WBC 11.4 (H) 4.3 - 11.1 K/uL    RBC 3.69 (L) 4.05 - 5.2 M/uL    HGB 9.7 (L) 11.7 - 15.4 g/dL    HCT 31.9 (L) 35.8 - 46.3 %    MCV 86.4 79.6 - 97.8 FL    MCH 26.3 26.1 - 32.9 PG    MCHC 30.4 (L) 31.4 - 35.0 g/dL    RDW 15.7 (H) 11.9 - 14.6 %    PLATELET 522 395 - 440 K/uL    MPV 10.3 9.4 - 12.3 FL    ABSOLUTE NRBC 0.00 0.0 - 0.2 K/uL   METABOLIC PANEL, BASIC    Collection Time: 11/12/19  9:59 AM   Result Value Ref Range Sodium 143 136 - 145 mmol/L    Potassium 4.0 3.5 - 5.1 mmol/L    Chloride 112 (H) 98 - 107 mmol/L    CO2 25 21 - 32 mmol/L    Anion gap 6 (L) 7 - 16 mmol/L    Glucose 87 65 - 100 mg/dL    BUN 22 8 - 23 MG/DL    Creatinine 1.19 (H) 0.6 - 1.0 MG/DL    GFR est AA 58 (L) >60 ml/min/1.73m2    GFR est non-AA 48 (L) >60 ml/min/1.73m2    Calcium 9.2 8.3 - 10.4 MG/DL   PROTHROMBIN TIME + INR    Collection Time: 11/12/19  9:59 AM   Result Value Ref Range    Prothrombin time 15.8 (H) 11.7 - 14.5 sec    INR 1.2     EKG, 12 LEAD, INITIAL    Collection Time: 11/12/19 10:19 AM   Result Value Ref Range    Ventricular Rate 56 BPM    Atrial Rate 56 BPM    P-R Interval 162 ms    QRS Duration 160 ms    Q-T Interval 458 ms    QTC Calculation (Bezet) 441 ms    Calculated P Axis 48 degrees    Calculated R Axis -29 degrees    Calculated T Axis 58 degrees    Diagnosis       Sinus bradycardia with occasional Premature ventricular complexes  Left bundle branch block  Abnormal ECG  No previous ECGs available  Confirmed by LM WALL (), Javier Markham ([id number]) on 11/12/2019 11:32:11 AM     POC ACTIVATED CLOTTING TIME    Collection Time: 11/12/19  1:02 PM   Result Value Ref Range    Activated Clotting Time (POC) 219 (H) 70 - 128 SECS   POC ACTIVATED CLOTTING TIME    Collection Time: 11/12/19  1:17 PM   Result Value Ref Range    Activated Clotting Time (POC) 274 (H) 70 - 128 SECS   EKG, 12 LEAD, INITIAL    Collection Time: 11/12/19  5:25 PM   Result Value Ref Range    Ventricular Rate 81 BPM    Atrial Rate 81 BPM    P-R Interval 156 ms    QRS Duration 160 ms    Q-T Interval 416 ms    QTC Calculation (Bezet) 483 ms    Calculated P Axis 116 degrees    Calculated R Axis -12 degrees    Calculated T Axis 137 degrees    Diagnosis       Normal sinus rhythm  Left bundle branch block  Abnormal ECG  When compared with ECG of 12-NOV-2019 10:19,  Premature ventricular complexes are no longer Present     METABOLIC PANEL, BASIC    Collection Time: 11/13/19  6:00 AM Result Value Ref Range    Sodium 141 136 - 145 mmol/L    Potassium 4.2 3.5 - 5.1 mmol/L    Chloride 109 (H) 98 - 107 mmol/L    CO2 26 21 - 32 mmol/L    Anion gap 6 (L) 7 - 16 mmol/L    Glucose 99 65 - 100 mg/dL    BUN 19 8 - 23 MG/DL    Creatinine 1.21 (H) 0.6 - 1.0 MG/DL    GFR est AA 56 (L) >60 ml/min/1.73m2    GFR est non-AA 47 (L) >60 ml/min/1.73m2    Calcium 8.7 8.3 - 10.4 MG/DL   LIPID PANEL    Collection Time: 11/13/19  6:00 AM   Result Value Ref Range    LIPID PROFILE          Cholesterol, total 99 <200 MG/DL    Triglyceride 73 35 - 150 MG/DL    HDL Cholesterol 50 40 - 60 MG/DL    LDL, calculated 34.4 <100 MG/DL    VLDL, calculated 14.6 6.0 - 23.0 MG/DL    CHOL/HDL Ratio 2.0     MAGNESIUM    Collection Time: 11/13/19  6:00 AM   Result Value Ref Range    Magnesium 1.8 1.8 - 2.4 mg/dL   CBC WITH AUTOMATED DIFF    Collection Time: 11/13/19  6:00 AM   Result Value Ref Range    WBC 12.0 (H) 4.3 - 11.1 K/uL    RBC 3.74 (L) 4.05 - 5.2 M/uL    HGB 9.6 (L) 11.7 - 15.4 g/dL    HCT 31.5 (L) 35.8 - 46.3 %    MCV 84.2 79.6 - 97.8 FL    MCH 25.7 (L) 26.1 - 32.9 PG    MCHC 30.5 (L) 31.4 - 35.0 g/dL    RDW 15.5 (H) 11.9 - 14.6 %    PLATELET 988 545 - 367 K/uL    MPV 10.2 9.4 - 12.3 FL    ABSOLUTE NRBC 0.00 0.0 - 0.2 K/uL    DF AUTOMATED      NEUTROPHILS 70 43 - 78 %    LYMPHOCYTES 19 13 - 44 %    MONOCYTES 6 4.0 - 12.0 %    EOSINOPHILS 4 0.5 - 7.8 %    BASOPHILS 0 0.0 - 2.0 %    IMMATURE GRANULOCYTES 0 0.0 - 5.0 %    ABS. NEUTROPHILS 8.4 (H) 1.7 - 8.2 K/UL    ABS. LYMPHOCYTES 2.3 0.5 - 4.6 K/UL    ABS. MONOCYTES 0.7 0.1 - 1.3 K/UL    ABS. EOSINOPHILS 0.5 0.0 - 0.8 K/UL    ABS. BASOPHILS 0.1 0.0 - 0.2 K/UL    ABS. IMM. GRANS. 0.1 0.0 - 0.5 K/UL         Patient Instructions:     Current Discharge Medication List      START taking these medications    Details   clopidogrel (PLAVIX) 75 mg tab Take 1 Tab by mouth daily.   Qty: 90 Tab, Refills: 3      dronedarone (MULTAQ) tab tablet Take 1 Tab by mouth two (2) times daily (with meals). Qty: 180 Tab, Refills: 3         CONTINUE these medications which have NOT CHANGED    Details   albuterol (PROVENTIL HFA, VENTOLIN HFA, PROAIR HFA) 90 mcg/actuation inhaler Take 2 Puffs by inhalation every four (4) hours as needed for Wheezing. alendronate (FOSAMAX) 70 mg tablet Take 70 mg by mouth every seven (7) days. allopurinol (ZYLOPRIM) 100 mg tablet Take 100 mg by mouth daily as needed. arformoterol (BROVANA) 15 mcg/2 mL nebu neb solution 15 mcg by Nebulization route two (2) times daily as needed. budesonide (PULMICORT) 0.5 mg/2 mL nbsp 500 mcg by Nebulization route two (2) times a day. ergocalciferol (VITAMIN D2) 50,000 unit capsule Take 50,000 Units by mouth every seven (7) days. levothyroxine (SYNTHROID) 75 mcg tablet Take 75 mcg by mouth Daily (before breakfast). dabigatran etexilate (PRADAXA) 150 mg capsule Take 1 Cap by mouth every twelve (12) hours. Qty: 180 Cap, Refills: 3    Associated Diagnoses: PAF (paroxysmal atrial fibrillation) ([facility])      olmesartan-hydroCHLOROthiazide (BENICAR HCT) 40-12.5 mg per tablet Take 1 Tab by mouth daily. Qty: 90 Tab, Refills: 3      metoprolol succinate (TOPROL-XL) 25 mg XL tablet Take 1 Tab by mouth daily. Qty: 90 Tab, Refills: 3    Associated Diagnoses: PAF (paroxysmal atrial fibrillation) ([facility])      simvastatin (ZOCOR) 20 mg tablet Take 1 Tab by mouth nightly. Qty: 90 Tab, Refills: 3      baclofen (LIORESAL) 20 mg tablet Take 20 mg by mouth three (3) times daily as needed. Pt only takes 1/2 tab daily       omeprazole (PRILOSEC) 20 mg capsule Take 20 mg by mouth daily. 1 tab bid      gabapentin (NEURONTIN) 300 mg capsule Take 600 mg by mouth nightly. 1 cap qid       hydrOXYzine HCl (ATARAX) 10 mg tablet Take 10 mg by mouth three (3) times daily as needed for Itching. rOPINIRole (REQUIP) 0.5 mg tablet Take 0.5 mg by mouth three (3) times daily as needed.       montelukast (SINGULAIR) 10 mg tablet Take 10 mg by mouth daily.         STOP taking these medications       flecainide (TAMBOCOR) 50 mg tablet Comments:   Reason for Stopping:

## 2019-11-13 NOTE — PROGRESS NOTES
Care Management Interventions  PCP Verified by CM: Yes  Last Visit to PCP: 10/29/19  Mode of Transport at Discharge:  Other (see comment)(Jaiden Hardy Spouse [phone] )  Transition of Care Consult (CM Consult): Discharge Planning  Discharge Durable Medical Equipment: No  Physical Therapy Consult: No  Occupational Therapy Consult: No  Speech Therapy Consult: No  Current Support Network: Lives with Spouse  Confirm Follow Up Transport: Family  Plan discussed with Pt/Family/Caregiver: Yes  Freedom of Choice Offered: Yes  Discharge Location  Discharge Placement: Home

## 2019-11-13 NOTE — PROGRESS NOTES
Plan home in AM.  Stop Flecainide. Start Multaq. Continue Pradaxa. Start Plavix. No Aspirin.       Compa Soriano MD

## 2022-06-29 ENCOUNTER — OFFICE VISIT (OUTPATIENT)
Dept: CARDIOLOGY CLINIC | Age: 77
End: 2022-06-29
Payer: MEDICARE

## 2022-06-29 VITALS
DIASTOLIC BLOOD PRESSURE: 70 MMHG | WEIGHT: 236.8 LBS | SYSTOLIC BLOOD PRESSURE: 136 MMHG | HEART RATE: 76 BPM | HEIGHT: 63 IN | BODY MASS INDEX: 41.96 KG/M2

## 2022-06-29 DIAGNOSIS — I35.0 NONRHEUMATIC AORTIC VALVE STENOSIS: ICD-10-CM

## 2022-06-29 DIAGNOSIS — I10 ESSENTIAL HYPERTENSION: ICD-10-CM

## 2022-06-29 DIAGNOSIS — I48.0 PAROXYSMAL A-FIB (HCC): Primary | ICD-10-CM

## 2022-06-29 DIAGNOSIS — Z79.01 ANTICOAGULANT LONG-TERM USE: ICD-10-CM

## 2022-06-29 DIAGNOSIS — I44.7 LBBB (LEFT BUNDLE BRANCH BLOCK): ICD-10-CM

## 2022-06-29 PROCEDURE — 99214 OFFICE O/P EST MOD 30 MIN: CPT | Performed by: INTERNAL MEDICINE

## 2022-06-29 PROCEDURE — 1036F TOBACCO NON-USER: CPT | Performed by: INTERNAL MEDICINE

## 2022-06-29 PROCEDURE — 3017F COLORECTAL CA SCREEN DOC REV: CPT | Performed by: INTERNAL MEDICINE

## 2022-06-29 PROCEDURE — G8427 DOCREV CUR MEDS BY ELIG CLIN: HCPCS | Performed by: INTERNAL MEDICINE

## 2022-06-29 PROCEDURE — G8417 CALC BMI ABV UP PARAM F/U: HCPCS | Performed by: INTERNAL MEDICINE

## 2022-06-29 PROCEDURE — 1090F PRES/ABSN URINE INCON ASSESS: CPT | Performed by: INTERNAL MEDICINE

## 2022-06-29 PROCEDURE — G8400 PT W/DXA NO RESULTS DOC: HCPCS | Performed by: INTERNAL MEDICINE

## 2022-06-29 PROCEDURE — 1123F ACP DISCUSS/DSCN MKR DOCD: CPT | Performed by: INTERNAL MEDICINE

## 2022-06-29 RX ORDER — OMEPRAZOLE 40 MG/1
40 CAPSULE, DELAYED RELEASE ORAL 2 TIMES DAILY
Qty: 90 CAPSULE | Refills: 3 | Status: SHIPPED | OUTPATIENT
Start: 2022-06-29

## 2022-06-29 RX ORDER — HYDROCHLOROTHIAZIDE 12.5 MG/1
12.5 TABLET ORAL DAILY
Qty: 90 TABLET | Refills: 3 | Status: SHIPPED | OUTPATIENT
Start: 2022-06-29

## 2022-06-29 RX ORDER — OLMESARTAN MEDOXOMIL 40 MG/1
40 TABLET ORAL DAILY
Qty: 90 TABLET | Refills: 3 | Status: SHIPPED | OUTPATIENT
Start: 2022-06-29

## 2022-06-29 RX ORDER — EZETIMIBE 10 MG/1
10 TABLET ORAL DAILY
Qty: 90 TABLET | Refills: 3 | Status: SHIPPED | OUTPATIENT
Start: 2022-06-29

## 2022-06-29 RX ORDER — METOPROLOL SUCCINATE 25 MG/1
25 TABLET, EXTENDED RELEASE ORAL DAILY
Qty: 90 TABLET | Refills: 3 | Status: SHIPPED | OUTPATIENT
Start: 2022-06-29

## 2022-06-29 ASSESSMENT — ENCOUNTER SYMPTOMS
ABDOMINAL PAIN: 0
NAUSEA: 0
COUGH: 0
ORTHOPNEA: 0
VOMITING: 0
SHORTNESS OF BREATH: 0
BLURRED VISION: 0
BLOATING: 0
HEMOPTYSIS: 0
DOUBLE VISION: 0
BACK PAIN: 0

## 2022-06-29 NOTE — PROGRESS NOTES
[facility] CARDIOLOGY  [address] [place] [address], [address] [facility], [address] [place] [address]  PHONE: [phone]    22    NAME:  Karon Burrell  : 1945  MRN: [id number]         SUBJECTIVE:   Alicia Martinez is a 78 y.o. female seen for a visit regarding the following:     Chief Complaint   Patient presents with    Hypertension     3 month follow up        HPI:      History of coronary disease status post PCI to the proximal LAD []. Hx of PAF and controlled on multaq and pradaxa (CHADVASc- 4). Hx of HTN, HLP, DM diet controlled,  COPD/Asthma, renal insuff, EDWARD on CPAP. Hx LBBB. Hx chronic venous insuff. Last echo with preserved EF and moderate aortic stenosis; moderate left atrial enlargement []; not significantly changed on study from 21. Extended Holter [14 days; ] with some short runs of SVT/NSVT-Holter triggered with some wide-complex rhythm noted while at rehab at [facility] asymptomatic; ?AIVR]. Noted admission at [facility] from 1/10/2022 with reported melena/hematochezia; lowest hemoglobin noted at 6.9; appears patient underwent GI work-up with endoscopy showing gastritis  Prior with hyperthyroidism and had been started on Tapazole per PCP from 2021. Reviewed endocrinology evaluation from 2021 and stated consistent with Graves' disease; used to be on Synthroid 75 mcg previously. Noted abnormal thyroid uptake scan.       Stable symptoms since last visit. Has been evaluated by GI and recently saw hematology from 2022. Last hemoglobin reviewed from was 9.8. Started with iron infusions. Currently off anticoagulation.       Review of records show longstanding stable anemia prior to recent admission with most hemoglobin ranging from 8-9 since 2019 in [facility]. Appears was attributed to iron deficiency anemia and patient states she has been taking iron supplementation with persistent issues with anemia. Currently off Pradaxa.   Has not been evaluated by GI List   Diagnosis    Mixed hyperlipidemia    Sleep apnea    Chronic venous insufficiency    Abnormal stress test    Renal insufficiency    Hyperlipidemia    [facility]    Chest pain    Essential hypertension    [facility]    Palpitations    Asthma    AS (aortic stenosis)    Atrial septal aneurysm    PAF (paroxysmal atrial fibrillation) ([facility]    History of left heart catheterization (LHC)     Past Medical History:   Diagnosis Date    Abnormal stress test 2006    Arthritis     AS (aortic stenosis)     Asthma 2015    Atrial septal aneurysm     CAD (coronary artery disease)     Chest pain 2015    Chronic kidney disease     Chronic venous insufficiency     COPD (chronic obstructive pulmonary disease) ([facility] 2015    Diabetes ([facility]     Essential hypertension 2015    History of left heart catheterization (LHC) 1990s     per pt - normal    Hyperlipidemia 2015    Mixed hyperlipidemia 2015    PAF (paroxysmal atrial fibrillation) ([facility] 2015    Palpitations 2015    Renal insufficiency     Sleep apnea 2015    w CPAP    Thyroid disease      Past Surgical History:   Procedure Laterality Date     SECTION      HYSTERECTOMY (CERVIX STATUS UNKNOWN)       History reviewed. No pertinent family history.   Social History     Tobacco Use    Smoking status: Never Smoker    Smokeless tobacco: Never Used   Substance Use Topics    Alcohol use: No     Current Outpatient Medications   Medication Sig Dispense Refill    dronedarone hcl (MULTAQ) 400 MG TABS Take 1 tablet by mouth 2 times daily (with meals) 180 tablet 3    ezetimibe (ZETIA) 10 MG tablet Take 1 tablet by mouth daily 90 tablet 3    hydroCHLOROthiazide (HYDRODIURIL) 12.5 MG tablet Take 1 tablet by mouth daily 90 tablet 3    metoprolol succinate (TOPROL XL) 25 MG extended release tablet Take 1 tablet by mouth daily 90 tablet 3    olmesartan (BENICAR) 40 MG tablet Take 1 tablet by mouth daily 90 tablet 3    omeprazole (PRILOSEC) 40 MG delayed release capsule Take 1 capsule by mouth in the morning and at bedtime 90 capsule 3    albuterol sulfate  (90 Base) MCG/ACT inhaler Inhale 2 puffs into the lungs every 4 hours as needed      allopurinol (ZYLOPRIM) 100 MG tablet Take 100 mg by mouth daily       cyclobenzaprine (FLEXERIL) 5 MG tablet Take 5 mg by mouth 3 times daily as needed      ergocalciferol (ERGOCALCIFEROL) 1.25 MG (17175 UT) capsule Take 50,000 Units by mouth every 7 days      fluticasone-umeclidin-vilant (TRELEGY ELLIPTA) 100-62.5-25 MCG/INH AEPB Inhale 1 puff into the lungs daily      gabapentin (NEURONTIN) 300 MG capsule Take 600 mg by mouth every evening.  meloxicam (MOBIC) 7.5 MG tablet Take 7.5 mg by mouth daily as needed      methIMAzole (TAPAZOLE) 5 MG tablet Take 5 mg by mouth daily      montelukast (SINGULAIR) 10 MG tablet Take 10 mg by mouth daily      traMADol (ULTRAM) 50 MG tablet Take 50 mg by mouth every 6 hours as needed. No current facility-administered medications for this visit. Review of Systems   Constitutional: Negative for chills, decreased appetite, fever, malaise/fatigue and weight gain. HENT: Negative for nosebleeds. Eyes: Negative for blurred vision and double vision. Cardiovascular: Positive for dyspnea on exertion (improved) and irregular heartbeat. Negative for chest pain, claudication, leg swelling, orthopnea, palpitations, paroxysmal nocturnal dyspnea and syncope. Respiratory: Negative for cough, hemoptysis and shortness of breath. Endocrine: Negative for cold intolerance and heat intolerance. Hematologic/Lymphatic: Negative for bleeding problem. Skin: Negative for rash. Musculoskeletal: Negative for back pain, joint pain, muscle weakness and myalgias. Gastrointestinal: Negative for bloating, abdominal pain, nausea and vomiting.    Genitourinary: Negative for dysuria. Neurological: Negative for dizziness, light-headedness and weakness. Psychiatric/Behavioral: Negative for altered mental status. PHYSICAL EXAM:    /70   Pulse 76   Ht 5' 3\" (1.6 m)   Wt 236 lb 12.8 oz (107.4 kg)   BMI 41.95 kg/m²      Physical Exam  Constitutional:       Appearance: Normal appearance. HENT:      Head: Normocephalic and atraumatic. Mouth/Throat:      Mouth: Mucous membranes are moist.   Eyes:      Pupils: Pupils are equal, round, and reactive to light. Neck:      Vascular: No carotid bruit. Cardiovascular:      Rate and Rhythm: Normal rate and regular rhythm. Pulses: Normal pulses. Heart sounds: Murmur (2/6 FERNIE at RUSB) heard. Pulmonary:      Effort: Pulmonary effort is normal.      Breath sounds: Normal breath sounds. Abdominal:      General: There is no distension. Palpations: Abdomen is soft. Tenderness: There is no abdominal tenderness. Musculoskeletal:         General: Swelling (tr) present. Cervical back: Normal range of motion. Skin:     General: Skin is warm and dry. Neurological:      General: No focal deficit present. Mental Status: She is alert. Psychiatric:         Mood and Affect: Mood normal.         Medical problems and test results were reviewed with the patient today. No results found for this or any previous visit (from the past 672 hour(s)). Lab Results   Component Value Date    CHOL 99 11/13/2019    HDL 50 11/13/2019       No results found for any visits on 06/29/22. ASSESSMENT and PLAN    Precious was seen today for hypertension. Diagnoses and all orders for this visit:    Paroxysmal A-fib ([address]    Essential hypertension    Anticoagulant long-term use    Nonrheumatic aortic valve stenosis    LBBB (left bundle branch block)    Other orders  -     dronedarone hcl (MULTAQ) 400 MG TABS; Take 1 tablet by mouth 2 times daily (with meals)  -     ezetimibe (ZETIA) 10 MG tablet;  Take 1 tablet by mouth daily  -     hydroCHLOROthiazide (HYDRODIURIL) 12.5 MG tablet; Take 1 tablet by mouth daily  -     metoprolol succinate (TOPROL XL) 25 MG extended release tablet; Take 1 tablet by mouth daily  -     olmesartan (BENICAR) 40 MG tablet; Take 1 tablet by mouth daily  -     omeprazole (PRILOSEC) 40 MG delayed release capsule; Take 1 capsule by mouth in the morning and at bedtime        Overall Impression    Weakness/fatigue-states improved     Dyspnea on exertion-improved     HLP-ideally high intensity statin. Attempted trial of low-dose Livalo; attempted statin vacation for myalgias and improved. Prior improved myalgias/cramps off simvastatin/low-dose Crestor; last LDL at 46 (4/19). Prior some possible myalgias and noted low vitamin D levels; discussed role of low levels with increased statin side effects-started supplementation and attempted simvastatin after with no significant change. Started zetia; last LDL 59 from 7/13/21 (non HDL 71; trig 58)     NSVT-controlled. Echo with preserved EF. No further work-up at this time. BMP from 7/13/2021 reviewed with potassium 4.2; creatinine 1.3     Cramps-See above with statin vacation. Improved     HTN- Controlled. Target less than 130/80; discussed guideline update.      PAF-not controlled; continue multaq; prior was on flecainide which was switched over once noted coronary disease. Pradaxa held with recent anemia/GI bleed. Hold off at this time until cleared work-up. Referred to hematology for anemia work-up with persistent issues in the past despite iron supplementation. Once cleared, likely attempt trial of Eliquis with repeat CBC checks to assess stability. Reviewed note with plans to check labs with parenteral iron supplementation  Long-term consideration of watchman device but wants to hold off at this time. GI evaluation from 3/2/2022 reviewed     Long-term use of anticoagulation-risk/benefits/alternatives discussed with patient.   See above.     EDWARD- compliant with CPAP     CAD-controlled; continue ASA.       Palpitations- infreq     Aortic stenosis-moderate per last echo. Plan repeat in 1 to 2 years or sooner based on symptoms. Notified of symptoms to monitor for with progression .     LBBB-notified of symptoms to monitor for progression     Fatigue-stressed CPAP compliance. Discussed possible contribution with medications     Other-sees endocrinology for hyperthyroidism. Discussed possible cardiac manifestations. Return in about 4 months (around 10/29/2022).      Yennifer Celestin MD  6/29/2022  12:43 PM

## 2022-08-23 DIAGNOSIS — D64.9 ANEMIA, UNSPECIFIED: ICD-10-CM

## 2022-08-23 DIAGNOSIS — I10 ESSENTIAL HYPERTENSION: Primary | ICD-10-CM

## 2022-08-23 NOTE — TELEPHONE ENCOUNTER
Patient last seen 6/29/22. Noted to be off anticoagulation (Pradaxa) d/t low hemoglobin, ranging from 8-9 since 2019 with most recent result of 10.6 on 8/3/22. Dr. Beni Wilson' note available in Care Everywhere.  Will send encounter to Dr. Jesus Horvath for instruction on if or what med to restart. //pg

## 2022-08-25 NOTE — TELEPHONE ENCOUNTER
Lab orders placed in chart, printed out & placed in bag with samples.      Medication pended & sent to Dr. Carmen Rodriguez for approval in Dr. Gee Davidson' absence from the office. //pg

## 2022-08-25 NOTE — TELEPHONE ENCOUNTER
Called patient, no answer. Left message, requesting that she return call to Alcus Session.  [facility] on voicemail. //pg

## 2022-08-25 NOTE — TELEPHONE ENCOUNTER
Patient returned call & was notified of Dr. Whitley Bryant response per Dr. Damaris Alcazar' office note. Patient requests that a prescription be sent to [facility] but it won't get to her for about 10-14 days. Explained that we can provide samples so that she can go ahead & start on this medication. She will  samples from the [facility] office one day next week. Informed that when she picks up samples, I will also give her a lab slip to have labs repeated. She verbalized understanding & thanked.  //pg

## 2022-09-20 ENCOUNTER — TELEPHONE (OUTPATIENT)
Dept: CARDIOLOGY CLINIC | Age: 77
End: 2022-09-20

## 2022-09-20 NOTE — TELEPHONE ENCOUNTER
Cardiac Clearance        Physician or Practice Requesting:DR Jules   : [address] [facility] Phone Number: [phone]  Fax Number: [phone]  Date of Surgery/Procedure: 10/12  Type of Surgery or Procedure: Pill cam EGD placement   Type of Anesthesia: general  Type of Clearance Requested: Cardiac Clearance and Medication Hold  Medication to Hold:Eliquis   Days to Hold: 2 days

## 2022-10-26 ENCOUNTER — OFFICE VISIT (OUTPATIENT)
Dept: CARDIOLOGY CLINIC | Age: 77
End: 2022-10-26
Payer: MEDICARE

## 2022-10-26 VITALS
DIASTOLIC BLOOD PRESSURE: 60 MMHG | BODY MASS INDEX: 43.02 KG/M2 | HEART RATE: 60 BPM | SYSTOLIC BLOOD PRESSURE: 114 MMHG | WEIGHT: 242.8 LBS | HEIGHT: 63 IN

## 2022-10-26 DIAGNOSIS — I48.0 PAROXYSMAL A-FIB (HCC): Primary | ICD-10-CM

## 2022-10-26 DIAGNOSIS — Z79.01 ANTICOAGULANT LONG-TERM USE: ICD-10-CM

## 2022-10-26 DIAGNOSIS — I35.0 NONRHEUMATIC AORTIC (VALVE) STENOSIS: ICD-10-CM

## 2022-10-26 PROCEDURE — G8400 PT W/DXA NO RESULTS DOC: HCPCS | Performed by: INTERNAL MEDICINE

## 2022-10-26 PROCEDURE — G8428 CUR MEDS NOT DOCUMENT: HCPCS | Performed by: INTERNAL MEDICINE

## 2022-10-26 PROCEDURE — 1090F PRES/ABSN URINE INCON ASSESS: CPT | Performed by: INTERNAL MEDICINE

## 2022-10-26 PROCEDURE — G8417 CALC BMI ABV UP PARAM F/U: HCPCS | Performed by: INTERNAL MEDICINE

## 2022-10-26 PROCEDURE — 1123F ACP DISCUSS/DSCN MKR DOCD: CPT | Performed by: INTERNAL MEDICINE

## 2022-10-26 PROCEDURE — 3074F SYST BP LT 130 MM HG: CPT | Performed by: INTERNAL MEDICINE

## 2022-10-26 PROCEDURE — 3017F COLORECTAL CA SCREEN DOC REV: CPT | Performed by: INTERNAL MEDICINE

## 2022-10-26 PROCEDURE — 3078F DIAST BP <80 MM HG: CPT | Performed by: INTERNAL MEDICINE

## 2022-10-26 PROCEDURE — 99214 OFFICE O/P EST MOD 30 MIN: CPT | Performed by: INTERNAL MEDICINE

## 2022-10-26 PROCEDURE — G8484 FLU IMMUNIZE NO ADMIN: HCPCS | Performed by: INTERNAL MEDICINE

## 2022-10-26 PROCEDURE — 1036F TOBACCO NON-USER: CPT | Performed by: INTERNAL MEDICINE

## 2022-10-26 ASSESSMENT — ENCOUNTER SYMPTOMS
ORTHOPNEA: 0
NAUSEA: 0
BLURRED VISION: 0
BLOATING: 0
DOUBLE VISION: 0
ABDOMINAL PAIN: 0
VOMITING: 0
HEMOPTYSIS: 0
BACK PAIN: 0
COUGH: 0
SHORTNESS OF BREATH: 0

## 2022-10-26 NOTE — PROGRESS NOTES
[facility] CARDIOLOGY  [address], [address]  PHONE: [phone]    10/26/22    NAME:  Joselito Burrell  : 1945  MRN: [id number]         SUBJECTIVE:   Cherry Orozco is a 78 y.o. female seen for a visit regarding the following:     Chief Complaint   Patient presents with    Hypertension     4 month follow up     Surgical Clearance     upcoming EGD on  w/ Dr. Almaz Perrin - needs Eliquis hold clearance       HPI:      History of coronary disease status post PCI to the proximal LAD []. Hx of PAF and controlled on multaq and pradaxa (CHADVASc- 4). Hx of HTN, HLP, DM diet controlled,  COPD/Asthma, renal insuff, EDWARD on CPAP. Hx LBBB. Hx chronic venous insuff. Last echo with preserved EF and moderate aortic stenosis; moderate left atrial enlargement []; not significantly changed on study from 21. Extended Holter [14 days; ] with some short runs of SVT/NSVT-Holter triggered with some wide-complex rhythm noted while at rehab at [facility] asymptomatic; ?AIVR]. Noted admission at [facility] from 1/10/2022 with reported melena/hematochezia; lowest hemoglobin noted at 6.9; appears patient underwent GI work-up with endoscopy showing gastritis  Prior with hyperthyroidism and had been started on Tapazole per PCP from 2021. Reviewed endocrinology evaluation from 2021 and stated consistent with Graves' disease; used to be on Synthroid 75 mcg previously. Noted abnormal thyroid uptake scan. Stable symptoms since last visit. Has been evaluated by GI and recently saw hematology from 2022. Appears pending EGD. Initiated anticoagulation after cleared by heme-onc from 2022; subsequent check with CBC from 2022 with hemoglobin at 10.8 and improved. Gets iron nfusions per hematology. Review of records show longstanding stable anemia prior to recent admission with most hemoglobin ranging from 8-9 since 2019 in [facility]. Annalisa was attributed to iron deficiency anemia and patient states she has been taking iron supplementation with persistent issues with anemia. Currently off Pradaxa. Has not been evaluated by GI and annalisa had surgery do EGD while at [facility]     Previously, had improved myalgias off Livalo/simvastatin; attempted low-dose Crestor as with recurrent myalgias which she stopped as well. Otherwise, no other new symptoms. Tolerating Zetia      Prior--Intermittent episodes of palpitations. Denies any dizziness. Issues with lower extremity cramps and no significant change despite vitamin D supplementation. Persistent issues with lower extremity edema and states having a hard time with compression hoses. Otherwise, doing well since recent PCI. States improved dyspnea. Also some improved chest discomfort. Vague historian. Some prior atypical chest discomfort. Several nonspecific complaints with neck/back pain which appear MS and no significant change since last visit. Mostly impeded by arthritis sees orthopedic surgery. Prior history of atypical chest pain at rest. Lasts <1 min with no exertional sx. No radiation/associated sx. No increase in freq. Prior with occ CP-states sharp/crampy. Prior hx of atypical sx. Some occasional palpitations. Several issues with some muscle/knee pain. Also chronic fatigue     Prior--Also transient episodes of atypical sharp CP that lasts a few secs and goes away; no radiation or associated sx; no radiation. Mild MARTELL at her baseline, no TIA/CVA sx. Her activity is impeded by LBP. PAF- controlled, HTN- controlled, HLP- not controlled, EDWARD- controlled, CAD-controlled, fatigue-not controlled, dyspnea-improved, NSVT-controlled, cramps-controlled       Past Medical History, Past Surgical History, Family history, Social History, and Medications were all reviewed with the patient today and updated as necessary.      Allergies   Allergen Reactions    Amoxicillin Other (See Comments) Codeine Other (See Comments)    Hydralazine Other (See Comments)    Nitrofurantoin Other (See Comments)     States it makes her tingly    Levofloxacin Rash     Patient Active Problem List   Diagnosis    Mixed hyperlipidemia    Sleep apnea    Chronic venous insufficiency    Abnormal stress test    Renal insufficiency    Hyperlipidemia    Diabetes ([address]    Chest pain    Essential hypertension    COPD (chronic obstructive pulmonary disease) (HCC)    Palpitations    Asthma    AS (aortic stenosis)    Atrial septal aneurysm    PAF (paroxysmal atrial fibrillation) ([address]    History of left heart catheterization (LH)     Past Medical History:   Diagnosis Date    Abnormal stress test 2006    Arthritis     AS (aortic stenosis)     Asthma 2015    Atrial septal aneurysm     CAD (coronary artery disease)     Chest pain 2015    Chronic kidney disease     Chronic venous insufficiency     COPD (chronic obstructive pulmonary disease) ([address] 2015    Diabetes [address] [address]     Essential hypertension 2015    History of left heart catheterization ([facility]) 1990s     per pt - normal    Hyperlipidemia 2015    Mixed hyperlipidemia 2015    PAF (paroxysmal atrial fibrillation) ([address] 2015    Palpitations 2015    Renal insufficiency     Sleep apnea 2015    w CPAP    Thyroid disease      Past Surgical History:   Procedure Laterality Date     SECTION      HYSTERECTOMY (CERVIX STATUS UNKNOWN)       No family history on file.   Social History     Tobacco Use    Smoking status: Never    Smokeless tobacco: Never   Substance Use Topics    Alcohol use: No     Current Outpatient Medications   Medication Sig Dispense Refill    apixaban (ELIQUIS) 5 MG TABS tablet Take 1 tablet by mouth 2 times daily 180 tablet 3    dronedarone hcl (MULTAQ) 400 MG TABS Take 1 tablet by mouth 2 times daily (with meals) 180 tablet 3    ezetimibe (ZETIA) 10 MG tablet Take 1 tablet by mouth daily 90 tablet 3 hydroCHLOROthiazide (HYDRODIURIL) 12.5 MG tablet Take 1 tablet by mouth daily (Patient taking differently: Take 12.5 mg by mouth in the morning and at bedtime) 90 tablet 3    metoprolol succinate (TOPROL XL) 25 MG extended release tablet Take 1 tablet by mouth daily 90 tablet 3    olmesartan (BENICAR) 40 MG tablet Take 1 tablet by mouth daily 90 tablet 3    omeprazole (PRILOSEC) 40 MG delayed release capsule Take 1 capsule by mouth in the morning and at bedtime 90 capsule 3    albuterol sulfate  (90 Base) MCG/ACT inhaler Inhale 2 puffs into the lungs every 4 hours as needed      allopurinol (ZYLOPRIM) 100 MG tablet Take 100 mg by mouth daily       cyclobenzaprine (FLEXERIL) 5 MG tablet Take 5 mg by mouth 3 times daily as needed      ergocalciferol (ERGOCALCIFEROL) 1.25 MG (79864 UT) capsule Take 50,000 Units by mouth every 7 days      fluticasone-umeclidin-vilant (TRELEGY ELLIPTA) 100-62.5-25 MCG/INH AEPB Inhale 1 puff into the lungs daily      gabapentin (NEURONTIN) 300 MG capsule Take 600 mg by mouth every evening. meloxicam (MOBIC) 7.5 MG tablet Take 7.5 mg by mouth daily as needed      methIMAzole (TAPAZOLE) 5 MG tablet Take 5 mg by mouth daily      montelukast (SINGULAIR) 10 MG tablet Take 10 mg by mouth daily      traMADol (ULTRAM) 50 MG tablet Take 50 mg by mouth every 6 hours as needed. No current facility-administered medications for this visit. Review of Systems   Constitutional: Negative for chills, decreased appetite, fever, malaise/fatigue and weight gain. HENT:  Negative for nosebleeds. Eyes:  Negative for blurred vision and double vision. Cardiovascular:  Positive for dyspnea on exertion (improved) and irregular heartbeat. Negative for chest pain, claudication, leg swelling, orthopnea, palpitations, paroxysmal nocturnal dyspnea and syncope. Respiratory:  Negative for cough, hemoptysis and shortness of breath.     Endocrine: Negative for cold intolerance and heat intolerance. Hematologic/Lymphatic: Negative for bleeding problem. Skin:  Negative for rash. Musculoskeletal:  Negative for back pain, joint pain, muscle weakness and myalgias. Gastrointestinal:  Negative for bloating, abdominal pain, nausea and vomiting. Genitourinary:  Negative for dysuria. Neurological:  Negative for dizziness, light-headedness and weakness. Psychiatric/Behavioral:  Negative for altered mental status. PHYSICAL EXAM:    /60   Pulse 60   Ht 5' 3\" (1.6 m)   Wt 242 lb 12.8 oz (110.1 kg)   BMI 43.01 kg/m²      Physical Exam  Constitutional:       Appearance: Normal appearance. HENT:      Head: Normocephalic and atraumatic. Mouth/Throat:      Mouth: Mucous membranes are moist.   Eyes:      Pupils: Pupils are equal, round, and reactive to light. Neck:      Vascular: No carotid bruit. Cardiovascular:      Rate and Rhythm: Normal rate and regular rhythm. Pulses: Normal pulses. Heart sounds: Murmur (2/6 FERNIE at RUSB) heard. Pulmonary:      Effort: Pulmonary effort is normal.      Breath sounds: Normal breath sounds. Abdominal:      General: There is no distension. Palpations: Abdomen is soft. Tenderness: There is no abdominal tenderness. Musculoskeletal:         General: Swelling (tr) present. Cervical back: Normal range of motion. Skin:     General: Skin is warm and dry. Neurological:      General: No focal deficit present. Mental Status: She is alert. Psychiatric:         Mood and Affect: Mood normal.       Medical problems and test results were reviewed with the patient today. No results found for this or any previous visit (from the past 672 hour(s)). Lab Results   Component Value Date/Time    CHOL 99 11/13/2019 06:00 AM    HDL 50 11/13/2019 06:00 AM       No results found for any visits on 10/26/22. ASSESSMENT and PLAN    Precious was seen today for hypertension and surgical clearance.     Diagnoses and all orders for this visit:    Paroxysmal A-fib (HCC)    Anticoagulant long-term use    Nonrheumatic aortic (valve) stenosis  -     Transthoracic echocardiogram (TTE) complete with contrast, bubble, strain, and 3D PRN; Future      Overall Impression    Weakness/fatigue-states improved     Dyspnea on exertion-improved     HLP-ideally high intensity statin. Attempted trial of low-dose Livalo; attempted statin vacation for myalgias and improved. Prior improved myalgias/cramps off simvastatin/low-dose Crestor; last LDL at 46 (4/19). Prior some possible myalgias and noted low vitamin D levels; discussed role of low levels with increased statin side effects-started supplementation and attempted simvastatin after with no significant change. Started zetia; last LDL 59 from 7/13/21 (non HDL 71; trig 58)     NSVT-controlled. Echo with preserved EF. No further work-up at this time. Last BMP reviewed and okay     Cramps-See above with statin vacation. Improved     HTN- Controlled. Target less than 130/80; discussed guideline update. PAF-controlled; continue multaq; prior was on flecainide which was switched over once noted coronary disease. Pradaxa held with recent anemia/GI bleed. Subsequently cleared by heme-onc and initiated Eliquis with stable hemoglobin. Prior referred to hematology for anemia work-up with persistent issues in the past despite iron supplementation. Pending EGD and discussed okay to hold anticoagulation for 1 to 2 days and restart postprocedure. Long-term use of anticoagulation-risk/benefits/alternatives discussed with patient. See above. EDWARD- compliant with CPAP     CAD-controlled; continue ASA. Palpitations- infreq     Aortic stenosis-moderate per last echo. Plan repeat. Notified of symptoms to monitor for with progression . LBBB-notified of symptoms to monitor for progression     Fatigue-stressed CPAP compliance.   Discussed possible contribution with medications     Other-sees endocrinology for hyperthyroidism. Discussed possible cardiac manifestations. Return in about 6 months (around 4/26/2023).      Sofia Porras MD  10/26/2022  11:19 AM

## 2022-11-28 ENCOUNTER — TELEPHONE (OUTPATIENT)
Dept: CARDIOLOGY CLINIC | Age: 77
End: 2022-11-28

## 2022-11-28 NOTE — TELEPHONE ENCOUNTER
Cardiac Clearance        Physician or Practice Requesting:Dr Torres  : ?  Contact Phone Number: [phone]  Fax Number: [phone]  Date of Surgery/Procedure: ? Type of Surgery or Procedure: Right Knee  Type of Anesthesia: ?   Type of Clearance Requested: Cardiac and med  Medication to Hold:Eliquis Days to Hold: 4 days prior

## 2022-11-28 NOTE — TELEPHONE ENCOUNTER
Will call for more information regarding procedure tomorrow when clearance requesting office is open.

## 2022-12-01 ENCOUNTER — TELEPHONE (OUTPATIENT)
Dept: CARDIOLOGY CLINIC | Age: 77
End: 2022-12-01

## 2022-12-01 NOTE — TELEPHONE ENCOUNTER
Pt is having a radiofrequency ablation on her right knee. Pt is currently taking Eliquis 5 mg BID. Requesting 4 day hold prior to ablation.

## 2022-12-01 NOTE — TELEPHONE ENCOUNTER
Per Esperanza Hayes to hold for 1 to 2 days and restart postprocedure. Current standard of care.  \"

## 2023-03-10 ENCOUNTER — TELEPHONE (OUTPATIENT)
Dept: CARDIOLOGY CLINIC | Age: 78
End: 2023-03-10
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
## 2023-09-12 ENCOUNTER — TELEPHONE (OUTPATIENT)
Age: 78
End: 2023-09-12

## 2023-09-13 NOTE — TELEPHONE ENCOUNTER
Called patient who voiced understanding of Dr. Rita Henson' response. Per Dr. Lisbeth Sarkar, Would rather she use Tylenol instead with prior history. Pt states that she is taking Tramadol and Hydrocodone and asks if he is ok with her taking these 2 mediations.

## 2023-10-18 ENCOUNTER — OFFICE VISIT (OUTPATIENT)
Age: 78
End: 2023-10-18
Payer: MEDICARE

## 2023-10-18 ENCOUNTER — TELEPHONE (OUTPATIENT)
Age: 78
End: 2023-10-18

## 2023-10-18 VITALS
HEIGHT: 63 IN | SYSTOLIC BLOOD PRESSURE: 112 MMHG | HEART RATE: 58 BPM | WEIGHT: 220 LBS | BODY MASS INDEX: 38.98 KG/M2 | DIASTOLIC BLOOD PRESSURE: 60 MMHG

## 2023-10-18 DIAGNOSIS — I35.0 NONRHEUMATIC AORTIC (VALVE) STENOSIS: ICD-10-CM

## 2023-10-18 DIAGNOSIS — Z79.01 ANTICOAGULANT LONG-TERM USE: ICD-10-CM

## 2023-10-18 DIAGNOSIS — R00.2 PALPITATIONS: Primary | ICD-10-CM

## 2023-10-18 DIAGNOSIS — I48.0 PAROXYSMAL A-FIB (HCC): ICD-10-CM

## 2023-10-18 PROCEDURE — 93000 ELECTROCARDIOGRAM COMPLETE: CPT | Performed by: INTERNAL MEDICINE

## 2023-10-18 PROCEDURE — 3074F SYST BP LT 130 MM HG: CPT | Performed by: INTERNAL MEDICINE

## 2023-10-18 PROCEDURE — G8417 CALC BMI ABV UP PARAM F/U: HCPCS | Performed by: INTERNAL MEDICINE

## 2023-10-18 PROCEDURE — 99214 OFFICE O/P EST MOD 30 MIN: CPT | Performed by: INTERNAL MEDICINE

## 2023-10-18 PROCEDURE — G8400 PT W/DXA NO RESULTS DOC: HCPCS | Performed by: INTERNAL MEDICINE

## 2023-10-18 PROCEDURE — 1123F ACP DISCUSS/DSCN MKR DOCD: CPT | Performed by: INTERNAL MEDICINE

## 2023-10-18 PROCEDURE — G8484 FLU IMMUNIZE NO ADMIN: HCPCS | Performed by: INTERNAL MEDICINE

## 2023-10-18 PROCEDURE — 1090F PRES/ABSN URINE INCON ASSESS: CPT | Performed by: INTERNAL MEDICINE

## 2023-10-18 PROCEDURE — 1036F TOBACCO NON-USER: CPT | Performed by: INTERNAL MEDICINE

## 2023-10-18 PROCEDURE — 3078F DIAST BP <80 MM HG: CPT | Performed by: INTERNAL MEDICINE

## 2023-10-18 PROCEDURE — 3017F COLORECTAL CA SCREEN DOC REV: CPT | Performed by: INTERNAL MEDICINE

## 2023-10-18 PROCEDURE — G8427 DOCREV CUR MEDS BY ELIG CLIN: HCPCS | Performed by: INTERNAL MEDICINE

## 2023-10-18 RX ORDER — TRAMADOL HYDROCHLORIDE 50 MG/1
50 TABLET ORAL EVERY 6 HOURS PRN
COMMUNITY

## 2023-10-18 RX ORDER — HYDROCHLOROTHIAZIDE 12.5 MG/1
25 TABLET ORAL DAILY
Qty: 90 TABLET | Refills: 3 | Status: CANCELLED | OUTPATIENT
Start: 2023-10-18

## 2023-10-18 RX ORDER — EZETIMIBE 10 MG/1
10 TABLET ORAL DAILY
Qty: 90 TABLET | Refills: 3 | Status: SHIPPED | OUTPATIENT
Start: 2023-10-18

## 2023-10-18 RX ORDER — METOPROLOL SUCCINATE 25 MG/1
25 TABLET, EXTENDED RELEASE ORAL DAILY
Qty: 90 TABLET | Refills: 3 | Status: SHIPPED | OUTPATIENT
Start: 2023-10-18

## 2023-10-18 RX ORDER — HYDROCODONE BITARTRATE AND ACETAMINOPHEN 5; 325 MG/1; MG/1
1 TABLET ORAL EVERY 6 HOURS PRN
COMMUNITY

## 2023-10-18 RX ORDER — OLMESARTAN MEDOXOMIL 40 MG/1
40 TABLET ORAL DAILY
Qty: 90 TABLET | Refills: 3 | Status: SHIPPED | OUTPATIENT
Start: 2023-10-18

## 2023-10-18 ASSESSMENT — ENCOUNTER SYMPTOMS
ORTHOPNEA: 0
ABDOMINAL PAIN: 0
BLURRED VISION: 0
VOMITING: 0
BACK PAIN: 0
DOUBLE VISION: 0
COUGH: 0
HEMOPTYSIS: 0
SHORTNESS OF BREATH: 0
NAUSEA: 0
BLOATING: 0

## 2023-10-18 NOTE — TELEPHONE ENCOUNTER
Pt called and stated that dr Philip Hicks wanted to know if she could now have her knee and hip replacement. Pt would appreciate a call back with Dr Philip Sample opinion on these procedures.

## 2023-10-18 NOTE — PROGRESS NOTES
Never   Substance Use Topics    Alcohol use: No     Current Outpatient Medications   Medication Sig Dispense Refill    traMADol (ULTRAM) 50 MG tablet Take 1 tablet by mouth every 6 hours as needed for Pain. Max Daily Amount: 200 mg      HYDROcodone-acetaminophen (NORCO) 5-325 MG per tablet Take 1 tablet by mouth every 6 hours as needed for Pain. Max Daily Amount: 4 tablets      apixaban (ELIQUIS) 5 MG TABS tablet Take 1 tablet by mouth 2 times daily 180 tablet 3    dronedarone hcl (MULTAQ) 400 MG TABS Take 1 tablet by mouth 2 times daily (with meals) 180 tablet 3    ezetimibe (ZETIA) 10 MG tablet Take 1 tablet by mouth daily 90 tablet 3    metoprolol succinate (TOPROL XL) 25 MG extended release tablet Take 1 tablet by mouth daily 90 tablet 3    olmesartan (BENICAR) 40 MG tablet Take 1 tablet by mouth daily 90 tablet 3    omeprazole (PRILOSEC) 40 MG delayed release capsule Take 1 capsule by mouth in the morning and at bedtime 90 capsule 3    albuterol sulfate  (90 Base) MCG/ACT inhaler Inhale 2 puffs into the lungs every 4 hours as needed      allopurinol (ZYLOPRIM) 100 MG tablet Take 1 tablet by mouth daily      ergocalciferol (ERGOCALCIFEROL) 1.25 MG (15403 UT) capsule Take 1 capsule by mouth every 7 days      fluticasone-umeclidin-vilant (TRELEGY ELLIPTA) 100-62.5-25 MCG/INH AEPB Inhale 1 puff into the lungs daily      methIMAzole (TAPAZOLE) 5 MG tablet Take 2 tablets by mouth daily      montelukast (SINGULAIR) 10 MG tablet Take 1 tablet by mouth daily       No current facility-administered medications for this visit. Review of Systems   Constitutional: Negative for chills, decreased appetite, fever, malaise/fatigue and weight gain. HENT:  Negative for nosebleeds. Eyes:  Negative for blurred vision and double vision. Cardiovascular:  Positive for dyspnea on exertion (improved) and irregular heartbeat.  Negative for chest pain, claudication, leg swelling, orthopnea, palpitations, paroxysmal

## 2023-10-19 NOTE — TELEPHONE ENCOUNTER
Per Dr. Shant Nicholson, Nish Brown is likely at least moderate risk with moderate to severe aortic stenosis; uncertain regarding her coronary status with no recent ischemic evaluation with stable symptoms but likely some progression on that as well. May consider stress testing prior if they decide to proceed with surgery\".

## 2023-10-20 ENCOUNTER — TELEPHONE (OUTPATIENT)
Dept: CARDIOLOGY CLINIC | Age: 78
End: 2023-10-20

## 2023-10-20 NOTE — TELEPHONE ENCOUNTER
Pt called in to give new meds that e kidney doctor has put her on : caleitriol 0.25MG taken 2x a week,sodium bicaronte 650MG  1x daily capsules.  Pt wants to know if ok to start taking these

## 2024-01-05 ENCOUNTER — TELEPHONE (OUTPATIENT)
Age: 79
End: 2024-01-05

## 2024-01-05 NOTE — TELEPHONE ENCOUNTER
Patient  having Genicular nerve block with dr. Roberson on TBD . Requesting cardiac clearance and eliquis hold for 3 days . Fax: [phone]

## 2024-01-11 ENCOUNTER — TELEPHONE (OUTPATIENT)
Age: 79
End: 2024-01-11

## 2024-01-11 NOTE — TELEPHONE ENCOUNTER
Cardiac Clearance        Physician or Practice Requesting:[facility]  : Office  Contact Phone Number: [phone]  Fax Number: [phone]  Date of Surgery/Procedure: TBD  Type of Surgery or Procedure Geniculier Nerve Block:   Type of Anesthesia: ???        Type of Clearance Requested:   Medication to Hold:Eliquis  Days to Hold: 3 Days prior

## 2024-04-24 ENCOUNTER — OFFICE VISIT (OUTPATIENT)
Age: 79
End: 2024-04-24
Payer: MEDICARE

## 2024-04-24 VITALS
WEIGHT: 206 LBS | SYSTOLIC BLOOD PRESSURE: 130 MMHG | HEART RATE: 63 BPM | DIASTOLIC BLOOD PRESSURE: 70 MMHG | HEIGHT: 62 IN | BODY MASS INDEX: 37.91 KG/M2

## 2024-04-24 DIAGNOSIS — I44.7 LBBB (LEFT BUNDLE BRANCH BLOCK): ICD-10-CM

## 2024-04-24 DIAGNOSIS — I35.0 NONRHEUMATIC AORTIC VALVE STENOSIS: Primary | ICD-10-CM

## 2024-04-24 PROCEDURE — 93000 ELECTROCARDIOGRAM COMPLETE: CPT | Performed by: INTERNAL MEDICINE

## 2024-04-24 PROCEDURE — 1036F TOBACCO NON-USER: CPT | Performed by: INTERNAL MEDICINE

## 2024-04-24 PROCEDURE — 1090F PRES/ABSN URINE INCON ASSESS: CPT | Performed by: INTERNAL MEDICINE

## 2024-04-24 PROCEDURE — 99214 OFFICE O/P EST MOD 30 MIN: CPT | Performed by: INTERNAL MEDICINE

## 2024-04-24 PROCEDURE — G8400 PT W/DXA NO RESULTS DOC: HCPCS | Performed by: INTERNAL MEDICINE

## 2024-04-24 PROCEDURE — 1123F ACP DISCUSS/DSCN MKR DOCD: CPT | Performed by: INTERNAL MEDICINE

## 2024-04-24 PROCEDURE — 3075F SYST BP GE 130 - 139MM HG: CPT | Performed by: INTERNAL MEDICINE

## 2024-04-24 PROCEDURE — G8417 CALC BMI ABV UP PARAM F/U: HCPCS | Performed by: INTERNAL MEDICINE

## 2024-04-24 PROCEDURE — G8427 DOCREV CUR MEDS BY ELIG CLIN: HCPCS | Performed by: INTERNAL MEDICINE

## 2024-04-24 PROCEDURE — 3078F DIAST BP <80 MM HG: CPT | Performed by: INTERNAL MEDICINE

## 2024-04-24 RX ORDER — METOPROLOL SUCCINATE 25 MG/1
25 TABLET, EXTENDED RELEASE ORAL DAILY
Qty: 90 TABLET | Refills: 3 | Status: SHIPPED | OUTPATIENT
Start: 2024-04-24

## 2024-04-24 RX ORDER — LANOLIN ALCOHOL/MO/W.PET/CERES
400 CREAM (GRAM) TOPICAL DAILY
COMMUNITY

## 2024-04-24 RX ORDER — OMEPRAZOLE 40 MG/1
40 CAPSULE, DELAYED RELEASE ORAL 2 TIMES DAILY
Qty: 90 CAPSULE | Refills: 3 | Status: SHIPPED | OUTPATIENT
Start: 2024-04-24

## 2024-04-24 RX ORDER — FERROUS SULFATE 325(65) MG
325 TABLET ORAL
COMMUNITY

## 2024-04-24 RX ORDER — SODIUM BICARBONATE 650 MG/1
650 TABLET ORAL
COMMUNITY

## 2024-04-24 RX ORDER — OLMESARTAN MEDOXOMIL 20 MG/1
20 TABLET ORAL DAILY
Qty: 90 TABLET | Refills: 3 | Status: SHIPPED | OUTPATIENT
Start: 2024-04-24

## 2024-04-24 RX ORDER — EZETIMIBE 10 MG/1
10 TABLET ORAL DAILY
Qty: 90 TABLET | Refills: 3 | Status: SHIPPED | OUTPATIENT
Start: 2024-04-24

## 2024-04-24 RX ORDER — ONDANSETRON 4 MG/1
4 TABLET, FILM COATED ORAL EVERY 8 HOURS PRN
COMMUNITY

## 2024-04-24 ASSESSMENT — ENCOUNTER SYMPTOMS
COUGH: 0
BLURRED VISION: 0
NAUSEA: 0
SHORTNESS OF BREATH: 0
DOUBLE VISION: 0
BLOATING: 0
HEMOPTYSIS: 0
ABDOMINAL PAIN: 0
VOMITING: 0
BACK PAIN: 0
ORTHOPNEA: 0

## 2024-04-24 NOTE — PROGRESS NOTES
[facility] CARDIOLOGY  [address] [place] [address], [address]  [place] [address]  PHONE: [phone]    24    NAME:  Precious Burrell  : 1945  MRN: [id number]         SUBJECTIVE:   Precious Burrell is a 78 y.o. female seen for a visit regarding the following:     Chief Complaint   Patient presents with    Palpitations    Atrial Fibrillation       HPI:      History of coronary disease status post PCI to the proximal LAD []. Hx of PAF and controlled on multaq and pradaxa (CHADVASc- 4). Hx of HTN, HLP, DM diet controlled,  COPD/Asthma, renal insuff, EDWARD on CPAP. Hx LBBB. Hx chronic venous insuff. Last echo with preserved EF and moderate aortic stenosis; moderate left atrial enlargement []; not significantly changed on study from 21; repeat study from 2023 with preserved EF and moderate to severe aortic stenosis [DI 0.23; MG ~35mmHg , Svi ~36); stable on repeat study from 10/4/2023 [DI 0.27 with SVI of 36].  Extended Holter [14 days; ] with some short runs of SVT/NSVT-Holter triggered with some wide-complex rhythm noted while at rehab at [facility] [patient asymptomatic; ?AIVR].  Noted admission at [facility] from 1/10/2022 with reported melena/hematochezia; lowest hemoglobin noted at 6.9; appears patient underwent GI work-up with endoscopy showing gastritis  Prior with hyperthyroidism and had been started on Tapazole per PCP from 2021.  Reviewed endocrinology evaluation from 2021 and stated consistent with Graves' disease; used to be on Synthroid 75 mcg previously. Noted abnormal thyroid uptake scan.       Stable symptoms since last visit.  Occasional atypical sharp chest twinges lasting seconds.  No exertional chest discomfort/dizziness/syncope; stable dyspnea on exertion with more strenuous activity which has been longstanding.  Well-controlled home BPs.  Appears had some dizziness on olmesartan 40 mg daily and dose decreased per nephrology to 20 mg daily.    Prior--Noted

## 2024-05-29 NOTE — PROGRESS NOTES
11/12/19 2015   Oxygen Therapy   O2 Sat (%) 100 %   Pulse via Oximetry 60 beats per minute   O2 Device Room air   FIO2 (%) 21 %     Pt has home CPAP setup for HS 19-Jun-2024

## 2024-07-05 ENCOUNTER — TELEPHONE (OUTPATIENT)
Age: 79
End: 2024-07-05

## 2024-07-05 NOTE — TELEPHONE ENCOUNTER
Pt.w/no HX of stroke DVT I told her per UCARD guidelines when risk is intermediate,high or uncertian may hold Eliquis 24-48hr prior resume ASAP post.Pt.v/u will hold 24hrs prior.

## 2024-08-20 ENCOUNTER — TELEPHONE (OUTPATIENT)
Age: 79
End: 2024-08-20

## 2024-08-20 NOTE — TELEPHONE ENCOUNTER
Per Dr. Downey, \"See if we can work her in first for an echocardiogram and I will work current after the echocardiogram.  Reviewed the report from [facility] and need to reassess the aortic valve; her prior aortic stenosis is moderate to severe and at [facility], they called it mild to moderate which does not add up.  If there is progression, that would explain her drop if it is truly accurate.I will need to see the echo to decide how to move forward\".    Triage informed pt of Dr. Downey' response. Pt agrees to appt for echo on 8/28/24 at 12:30 in the [place] office. Pt confirms date, time, and location of appt.     
Pt has an echo scheduled for 9/7/24 in the [place] office. Follow up is in October.    Pt called and wanted to know if Dr Downey can see her next week in the [place] office as she just saw her Kidney doctor and dr suggested she see Dr Shayan middleton.  
Pt states that nephrologist wants her to follow up with Dr. Downey next week because she had an echo done at [facility] over the weekend.     Per report in care everywhere, EF was 25-30%.  Echo in October 2023, showed EF was 55-60%.      
no

## 2024-09-04 ENCOUNTER — OFFICE VISIT (OUTPATIENT)
Age: 79
End: 2024-09-04
Payer: MEDICARE

## 2024-09-04 VITALS
SYSTOLIC BLOOD PRESSURE: 102 MMHG | HEIGHT: 62 IN | HEART RATE: 68 BPM | DIASTOLIC BLOOD PRESSURE: 52 MMHG | BODY MASS INDEX: 36.14 KG/M2 | WEIGHT: 196.4 LBS

## 2024-09-04 DIAGNOSIS — I48.0 PAF (PAROXYSMAL ATRIAL FIBRILLATION) (HCC): Primary | ICD-10-CM

## 2024-09-04 DIAGNOSIS — I44.7 LBBB (LEFT BUNDLE BRANCH BLOCK): ICD-10-CM

## 2024-09-04 DIAGNOSIS — I35.0 NONRHEUMATIC AORTIC VALVE STENOSIS: ICD-10-CM

## 2024-09-04 DIAGNOSIS — I35.0 AORTIC VALVE STENOSIS, ETIOLOGY OF CARDIAC VALVE DISEASE UNSPECIFIED: Primary | ICD-10-CM

## 2024-09-04 DIAGNOSIS — Z79.01 ANTICOAGULANT LONG-TERM USE: ICD-10-CM

## 2024-09-04 PROCEDURE — 1036F TOBACCO NON-USER: CPT | Performed by: INTERNAL MEDICINE

## 2024-09-04 PROCEDURE — G8417 CALC BMI ABV UP PARAM F/U: HCPCS | Performed by: INTERNAL MEDICINE

## 2024-09-04 PROCEDURE — 99215 OFFICE O/P EST HI 40 MIN: CPT | Performed by: INTERNAL MEDICINE

## 2024-09-04 PROCEDURE — G8400 PT W/DXA NO RESULTS DOC: HCPCS | Performed by: INTERNAL MEDICINE

## 2024-09-04 PROCEDURE — 3078F DIAST BP <80 MM HG: CPT | Performed by: INTERNAL MEDICINE

## 2024-09-04 PROCEDURE — 3074F SYST BP LT 130 MM HG: CPT | Performed by: INTERNAL MEDICINE

## 2024-09-04 PROCEDURE — G8427 DOCREV CUR MEDS BY ELIG CLIN: HCPCS | Performed by: INTERNAL MEDICINE

## 2024-09-04 PROCEDURE — 1090F PRES/ABSN URINE INCON ASSESS: CPT | Performed by: INTERNAL MEDICINE

## 2024-09-04 PROCEDURE — 1123F ACP DISCUSS/DSCN MKR DOCD: CPT | Performed by: INTERNAL MEDICINE

## 2024-09-04 PROCEDURE — 93000 ELECTROCARDIOGRAM COMPLETE: CPT | Performed by: INTERNAL MEDICINE

## 2024-09-04 ASSESSMENT — ENCOUNTER SYMPTOMS
BACK PAIN: 0
DOUBLE VISION: 0
ABDOMINAL PAIN: 0
ORTHOPNEA: 0
NAUSEA: 0
COUGH: 0
BLOATING: 0
BLURRED VISION: 0
HEMOPTYSIS: 0
VOMITING: 0
SHORTNESS OF BREATH: 0

## 2024-09-04 NOTE — PROGRESS NOTES
Gastrointestinal:  Negative for bloating, abdominal pain, nausea and vomiting.   Genitourinary:  Negative for dysuria.   Neurological:  Negative for dizziness, light-headedness and weakness.   Psychiatric/Behavioral:  Negative for altered mental status.        PHYSICAL EXAM:    BP (!) 102/52   Pulse 68   Ht 1.575 m (5' 2\")   Wt 89.1 kg (196 lb 6.4 oz)   BMI 35.92 kg/m²      Physical Exam  Constitutional:       Appearance: Normal appearance.   HENT:      Head: Normocephalic and atraumatic.      Mouth/Throat:      Mouth: Mucous membranes are moist.   Eyes:      Pupils: Pupils are equal, round, and reactive to light.   Neck:      Vascular: No carotid bruit.   Cardiovascular:      Rate and Rhythm: Normal rate and regular rhythm.      Pulses: Normal pulses.      Heart sounds: Murmur (3/6 FERNIE at RUSB; dim S2) heard.   Pulmonary:      Effort: Pulmonary effort is normal.      Breath sounds: Normal breath sounds.   Abdominal:      General: There is no distension.      Palpations: Abdomen is soft.      Tenderness: There is no abdominal tenderness.   Musculoskeletal:         General: Swelling (tr) present.      Cervical back: Normal range of motion.   Skin:     General: Skin is warm and dry.   Neurological:      General: No focal deficit present.      Mental Status: She is alert.   Psychiatric:         Mood and Affect: Mood normal.         Medical problems and test results were reviewed with the patient today.     Recent Results (from the past 672 hour(s))   Echo (TTE) complete (PRN contrast/bubble/strain/3D)    Collection Time: 08/28/24  1:33 PM   Result Value Ref Range    LV ESV A4C 54 mL    LV EDV A4C 130 mL    LV ESV A2C 63 mL    LV EDV A2C 121 mL    LVOT VTI 16.6 cm    LV E' Septal Velocity 3 cm/s    LVOT Diameter 2.2 cm    LVOT Mean Gradient 2 mmHg    LVOT Peak Velocity 0.8 m/s    LV E' Lateral Velocity 3 cm/s    LVPWd 1.0 (A) 0.6 - 0.9 cm    LVOT Peak Gradient 3 mmHg    IVSd 0.9 0.6 - 0.9 cm    LVIDs 4.8 cm

## 2024-09-23 ENCOUNTER — TELEPHONE (OUTPATIENT)
Age: 79
End: 2024-09-23

## 2024-10-15 ENCOUNTER — TELEPHONE (OUTPATIENT)
Dept: CASE MANAGEMENT | Age: 79
End: 2024-10-15

## 2024-10-15 NOTE — TELEPHONE ENCOUNTER
Pt has rescheduled many times for her AV cardiac calcium score due to transportation issues. Plans now are for Friday at 11 am, 10/18.    [place]

## 2024-10-18 ENCOUNTER — HOSPITAL ENCOUNTER (OUTPATIENT)
Dept: CT IMAGING | Age: 79
Discharge: HOME | End: 2024-10-21
Attending: INTERNAL MEDICINE

## 2024-10-18 DIAGNOSIS — I35.0 AORTIC VALVE STENOSIS, ETIOLOGY OF CARDIAC VALVE DISEASE UNSPECIFIED: ICD-10-CM

## 2024-10-18 PROCEDURE — 75571 CT HRT W/O DYE W/CA TEST: CPT

## 2024-10-30 ENCOUNTER — OFFICE VISIT (OUTPATIENT)
Age: 79
End: 2024-10-30

## 2024-10-30 VITALS
SYSTOLIC BLOOD PRESSURE: 110 MMHG | DIASTOLIC BLOOD PRESSURE: 66 MMHG | WEIGHT: 200 LBS | BODY MASS INDEX: 36.58 KG/M2 | HEART RATE: 68 BPM

## 2024-10-30 DIAGNOSIS — R06.09 DYSPNEA ON EXERTION: ICD-10-CM

## 2024-10-30 DIAGNOSIS — I10 ESSENTIAL HYPERTENSION: Primary | ICD-10-CM

## 2024-10-30 DIAGNOSIS — I35.0 NONRHEUMATIC AORTIC (VALVE) STENOSIS: ICD-10-CM

## 2024-10-30 RX ORDER — OMEPRAZOLE 40 MG/1
40 CAPSULE, DELAYED RELEASE ORAL 2 TIMES DAILY
Qty: 90 CAPSULE | Refills: 3 | Status: SHIPPED | OUTPATIENT
Start: 2024-10-30

## 2024-10-30 RX ORDER — METOPROLOL SUCCINATE 25 MG/1
25 TABLET, EXTENDED RELEASE ORAL DAILY
Qty: 90 TABLET | Refills: 3 | Status: SHIPPED | OUTPATIENT
Start: 2024-10-30

## 2024-10-30 RX ORDER — EZETIMIBE 10 MG/1
10 TABLET ORAL DAILY
Qty: 90 TABLET | Refills: 3 | Status: SHIPPED | OUTPATIENT
Start: 2024-10-30

## 2024-10-30 RX ORDER — OLMESARTAN MEDOXOMIL 20 MG/1
20 TABLET ORAL DAILY
Qty: 90 TABLET | Refills: 3 | Status: SHIPPED | OUTPATIENT
Start: 2024-10-30

## 2024-10-30 ASSESSMENT — ENCOUNTER SYMPTOMS
ABDOMINAL PAIN: 0
COUGH: 0
BLOATING: 0
BACK PAIN: 0
DOUBLE VISION: 0
NAUSEA: 0
HEMOPTYSIS: 0
VOMITING: 0
ORTHOPNEA: 0
SHORTNESS OF BREATH: 0
BLURRED VISION: 0

## 2024-10-30 NOTE — PROGRESS NOTES
[facility] CARDIOLOGY  [address] [place] [address], [address]  [place] [address]  PHONE: [phone]    10/30/24    NAME:  Precious Burrell  : 1945  MRN: [id number]         SUBJECTIVE:   Precious Burrell is a 78 y.o. female seen for a visit regarding the following:     Chief Complaint   Patient presents with    Palpitations       HPI:      History of coronary disease status post PCI to the proximal LAD []. Hx of PAF and controlled on multaq and pradaxa (CHADVASc- 4). Hx of HTN, HLP, DM diet controlled,  COPD/Asthma, renal insuff, EDWARD on CPAP. Hx LBBB. Hx chronic venous insuff. Last echo with preserved EF and moderate aortic stenosis; moderate left atrial enlargement []; not significantly changed on study from 21; repeat study from 2023 with preserved EF and moderate to severe aortic stenosis [DI 0.23; MG ~35mmHg , Svi ~36); stable on repeat study from 10/4/2023 [DI 0.27 with SVI of 36].  Repeat study from 2024 with ejection fraction calculated at 52%; borderline severe aortic stenosis [DI 0.22 with SVI around 33; mean gradient 31 mmHg-criteria for low-flow low gradient severe AS with preserved EF].  Calcium score of aortic valve at 912 from 10/2024.  Extended Holter [14 days; ] with some short runs of SVT/NSVT-Holter triggered with some wide-complex rhythm noted while at rehab at [place] [patient asymptomatic; ?AIVR].  Noted admission at [facility] from 1/10/2022 with reported melena/hematochezia; lowest hemoglobin noted at 6.9; appears patient underwent GI work-up with endoscopy showing gastritis  Prior with hyperthyroidism and had been started on Tapazole per PCP from 2021.  Reviewed endocrinology evaluation from 2021 and stated consistent with Graves' disease; used to be on Synthroid 75 mcg previously. Noted abnormal thyroid uptake scan.       Currently with stable symptoms.  No PND/orthopnea.  Stable weight.  On Lasix 20 mg daily.    Prior from 2024-evaluated at

## 2025-04-30 ENCOUNTER — OFFICE VISIT (OUTPATIENT)
Age: 80
End: 2025-04-30
Payer: MEDICARE

## 2025-04-30 VITALS
HEART RATE: 67 BPM | DIASTOLIC BLOOD PRESSURE: 58 MMHG | SYSTOLIC BLOOD PRESSURE: 122 MMHG | WEIGHT: 209 LBS | HEIGHT: 62 IN | BODY MASS INDEX: 38.46 KG/M2

## 2025-04-30 DIAGNOSIS — I44.7 LBBB (LEFT BUNDLE BRANCH BLOCK): ICD-10-CM

## 2025-04-30 DIAGNOSIS — I35.0 NONRHEUMATIC AORTIC VALVE STENOSIS: ICD-10-CM

## 2025-04-30 DIAGNOSIS — I48.0 PAF (PAROXYSMAL ATRIAL FIBRILLATION) (HCC): Primary | ICD-10-CM

## 2025-04-30 DIAGNOSIS — I48.0 PAROXYSMAL A-FIB (HCC): ICD-10-CM

## 2025-04-30 DIAGNOSIS — Z79.01 ANTICOAGULANT LONG-TERM USE: ICD-10-CM

## 2025-04-30 PROCEDURE — 3074F SYST BP LT 130 MM HG: CPT | Performed by: INTERNAL MEDICINE

## 2025-04-30 PROCEDURE — 93000 ELECTROCARDIOGRAM COMPLETE: CPT | Performed by: INTERNAL MEDICINE

## 2025-04-30 PROCEDURE — G8400 PT W/DXA NO RESULTS DOC: HCPCS | Performed by: INTERNAL MEDICINE

## 2025-04-30 PROCEDURE — G8427 DOCREV CUR MEDS BY ELIG CLIN: HCPCS | Performed by: INTERNAL MEDICINE

## 2025-04-30 PROCEDURE — 1159F MED LIST DOCD IN RCRD: CPT | Performed by: INTERNAL MEDICINE

## 2025-04-30 PROCEDURE — 1090F PRES/ABSN URINE INCON ASSESS: CPT | Performed by: INTERNAL MEDICINE

## 2025-04-30 PROCEDURE — 1126F AMNT PAIN NOTED NONE PRSNT: CPT | Performed by: INTERNAL MEDICINE

## 2025-04-30 PROCEDURE — 1123F ACP DISCUSS/DSCN MKR DOCD: CPT | Performed by: INTERNAL MEDICINE

## 2025-04-30 PROCEDURE — 3078F DIAST BP <80 MM HG: CPT | Performed by: INTERNAL MEDICINE

## 2025-04-30 PROCEDURE — G8417 CALC BMI ABV UP PARAM F/U: HCPCS | Performed by: INTERNAL MEDICINE

## 2025-04-30 PROCEDURE — 99215 OFFICE O/P EST HI 40 MIN: CPT | Performed by: INTERNAL MEDICINE

## 2025-04-30 PROCEDURE — 1036F TOBACCO NON-USER: CPT | Performed by: INTERNAL MEDICINE

## 2025-04-30 RX ORDER — CALCITRIOL 0.25 UG/1
CAPSULE, LIQUID FILLED ORAL
COMMUNITY

## 2025-04-30 ASSESSMENT — ENCOUNTER SYMPTOMS
SHORTNESS OF BREATH: 0
ORTHOPNEA: 0
DOUBLE VISION: 0
HEMOPTYSIS: 0
ABDOMINAL PAIN: 0
COUGH: 0
BACK PAIN: 0
BLURRED VISION: 0
NAUSEA: 0
VOMITING: 0
BLOATING: 0

## 2025-04-30 NOTE — PROGRESS NOTES
attempted statin vacation for myalgias and improved.  Prior improved myalgias/cramps off simvastatin/low-dose Crestor; last LDL at 46 (4/19). Prior some possible myalgias and noted low vitamin D levels; discussed role of low levels with increased statin side effects-started supplementation and attempted simvastatin after with no significant change.    Started zetia; last LDL at 73 from 1/2024; prior LDL 59 from 7/13/21 (non HDL 71; trig 58)     NSVT  -controlled.  Echo with preserved EF.  No further work-up at this time.  Last BMP reviewed and okay     Cramps  -See above with statin vacation. Improved     HTN  - Controlled.  Target less than 130/80; discussed guideline update.  With worsening renal function, previously held off hydrochlorothiazide with improved creatinine to baseline at 1.4 from 1/2024.    -Continue current decrease olmesartan dose of 20 mg daily, also on low-dose Toprol-XL.     PAF  -controlled; continue multaq; prior was on flecainide which was switched over once noted coronary disease.  Pradaxa held with prior anemia/GI bleed.  Subsequently cleared by heme-onc and initiated Eliquis with stable hemoglobin.  Prior referred to hematology for anemia work-up with persistent issues in the past despite iron supplementation.       Long-term use of anticoagulation  -risk/benefits/alternatives discussed with patient.  See above.     EDWARD- compliant with CPAP     CAD-controlled; continue ASA.       Palpitations- infreq     LBBB-notified of symptoms to monitor for progression     Fatigue-stressed CPAP compliance.  Discussed possible contribution with medications     Other-sees endocrinology for hyperthyroidism.  Discussed possible cardiac manifestations.    Spent over 40 minutes with patient care.  Extensive discussion regarding above    Return in about 6 months (around 10/30/2025).     Piotr Downey MD  4/30/2025  12:38 PM

## 2025-05-02 ENCOUNTER — TELEPHONE (OUTPATIENT)
Dept: CASE MANAGEMENT | Age: 80
End: 2025-05-02

## 2025-05-02 DIAGNOSIS — I35.0 AORTIC STENOSIS, SEVERE: Primary | ICD-10-CM

## 2025-05-02 NOTE — TELEPHONE ENCOUNTER
Valve Coordinator -[facility]    [phone]    Aortic Valve Replacement Evaluation  CT scan              NAME: Indra         DATE: Monday, May 12            Arrival Time: 1:00 pm     Enter the hospital on the outpatient side  [address]  Go to the 2nd floor and check in with radiology    CT scan of the Neck/Chest/Abdomen/Pelvis:        Time: 1:15 pm    Nothing to eat or drink after: 11:00 am     Take your morning medications like normal, drink 2 eight ounce glasses of water before 11:00 am  TAVR consult with Dr. Luc Egan on May 29 at 10 am in [place]                 TAVR (Transcatheter Aortic Valve Replacement)  SAVR (Surgical Aortic Valve Replacement)

## 2025-05-12 ENCOUNTER — HOSPITAL ENCOUNTER (OUTPATIENT)
Dept: CT IMAGING | Age: 80
Discharge: HOME | End: 2025-05-14
Attending: INTERNAL MEDICINE
Payer: MEDICARE

## 2025-05-12 DIAGNOSIS — I35.0 AORTIC STENOSIS, SEVERE: ICD-10-CM

## 2025-05-12 LAB — CREAT BLD-MCNC: 1.56 MG/DL (ref 0.8–1.5)

## 2025-05-12 PROCEDURE — 70498 CT ANGIOGRAPHY NECK: CPT | Performed by: RADIOLOGY

## 2025-05-12 PROCEDURE — 75574 CT ANGIO HRT W/3D IMAGE: CPT

## 2025-05-12 PROCEDURE — 74174 CTA ABD&PLVS W/CONTRAST: CPT | Performed by: RADIOLOGY

## 2025-05-12 PROCEDURE — 82565 ASSAY OF CREATININE: CPT

## 2025-05-12 PROCEDURE — 74174 CTA ABD&PLVS W/CONTRAST: CPT

## 2025-05-12 PROCEDURE — 6360000004 HC RX CONTRAST MEDICATION: Performed by: INTERNAL MEDICINE

## 2025-05-12 PROCEDURE — 75574 CT ANGIO HRT W/3D IMAGE: CPT | Performed by: INTERNAL MEDICINE

## 2025-05-12 PROCEDURE — 70498 CT ANGIOGRAPHY NECK: CPT

## 2025-05-12 PROCEDURE — 71275 CT ANGIOGRAPHY CHEST: CPT | Performed by: RADIOLOGY

## 2025-05-12 RX ORDER — IOPAMIDOL 755 MG/ML
100 INJECTION, SOLUTION INTRAVASCULAR
Status: COMPLETED | OUTPATIENT
Start: 2025-05-12 | End: 2025-05-12

## 2025-05-12 RX ADMIN — IOPAMIDOL 100 ML: 755 INJECTION, SOLUTION INTRAVENOUS at 12:54

## 2025-05-12 ASSESSMENT — KANSAS CITY CARDIOMYOPATHY QUESTIONNAIRE (KCCQ12)
7. IF YOU HAD TO SPEND THE REST OF YOUR LIFE WITH YOUR HEART FAILURE THE WAY IT IS RIGHT NOW, HOW WOULD YOU FEEL ABOUT THIS?: MOSTLY DISSATISFIED
8C. OVER THE PAST 2 WEEKS, ON AVERAGE, HOW HAS HEART FAILURE LIMITED YOU ABILITY TO VISIT FAMILY AND FRIENDS OUR OF YOUR HOME: LIMITED QUITE A BIT
4. OVER THE PAST 2 WEEKS, ON AVERAGE, HOW MANY TIMES HAS SHORTNESS OF BREATH LIMITED YOUR ABILITY TO DO WHAT YOU WANTED: SEVERAL TIMES PER DAY
2. OVER THE PAST 2 WEEKS, HOW MANY TIMES DID YOU HAVE SWELLING IN YOUR FEET, ANKLES OR LEGS WHEN YOU WOKE UP IN THE MORNING: NEVER OVER THE PAST 2 WEEKS
3. OVER THE PAST 2 WEEKS, ON AVERAGE, HOW MANY TIMES HAS FATIGUE LIMITED YOUR ABILITY TO DO WHAT YOU WANTED: SEVERAL TIMES PER DAY
8B. OVER THE PAST 2 WEEKS, ON AVERAGE, HOW HAS HEART FAILURE LIMITED YOU ABILITY TO WORK OR DO HOUSEHOLD CHORES: LIMITED QUITE A BIT
8A. OVER THE PAST 2 WEEKS, ON AVERAGE, HOW HAS HEART FAILURE LIMITED YOU ABILITY TO DO HOBBIES OR RECREATIONAL ACTIVITIES: LIMITED QUITE A BIT
1C. OVER THE PAST 2 WEEKS, HOW MUCH WERE YOU LIMITED BY HEART FAILURE SYMPTOMS (SHORTNESS OF BREATH OR FATIGUE) WHEN HURRYING OR JOGGING (AS IF TO CATCH A BUS): EXTREMELY LIMITED
1A. OVER THE PAST 2 WEEKS, HOW MUCH WERE YOU LIMITED BY HEART FAILURE SYMPTOMS (SHORTNESS OF BREATH OR FATIGUE) WHEN SHOWERING OR BATHING: QUITE A BIT LIMITED
6. OVER THE PAST 2 WEEKS, HOW MUCH HAS YOUR HEART FAILURE LIMITED YOUR ENJOYMENT OF LIFE: IT HAS LIMITED MY ENJOYMENT OF LIFE QUITE A BIT
5. OVER THE PAST 2 WEEKS, ON AVERAGE, HOW MANY TIMES HAVE YOU BEEN FORCED TO SLEEP SITTING UP IN A CHAIR OR WITH AT LEAST 3 PILLOWS TO PROP YOU UP BECAUSE OF SHORTNESS OF BREATH?: NEVER OVER THE PAST 2 WEEKS
1B. OVER THE PAST 2 WEEKS, HOW MUCH WERE YOU LIMITED BY HEART FAILURE SYMPTOMS (SHORTNESS OF BREATH OR FATIGUE) WHEN WALKING 1 BLOCK ON LEVEL GROUND: EXTREMELY LIMITED

## 2025-05-12 NOTE — NURSE NAVIGATOR
Educational information/pamphlet provided to the pt regarding aortic stenosis and treatment options (SAVR and TAVR). Provided pt the CardioSmart tool for review as the shared decision making process continues between pt and valve team physicians. Also explained that the CT will evaluate the pt for a potential TAVR/SAVR. Contact information provided for any further questions.    Pt ambulates short distances in her house with a cane but uses a wc in public for any long distances.     [place], Structural Heart Navigator

## 2025-05-29 ENCOUNTER — OFFICE VISIT (OUTPATIENT)
Age: 80
End: 2025-05-29
Payer: MEDICARE

## 2025-05-29 VITALS
DIASTOLIC BLOOD PRESSURE: 48 MMHG | SYSTOLIC BLOOD PRESSURE: 102 MMHG | WEIGHT: 206 LBS | BODY MASS INDEX: 37.91 KG/M2 | HEIGHT: 62 IN | HEART RATE: 64 BPM

## 2025-05-29 DIAGNOSIS — E78.2 MIXED HYPERLIPIDEMIA: ICD-10-CM

## 2025-05-29 DIAGNOSIS — I10 ESSENTIAL HYPERTENSION: ICD-10-CM

## 2025-05-29 DIAGNOSIS — I48.0 PAF (PAROXYSMAL ATRIAL FIBRILLATION) (HCC): ICD-10-CM

## 2025-05-29 DIAGNOSIS — I35.0 NONRHEUMATIC AORTIC VALVE STENOSIS: Primary | ICD-10-CM

## 2025-05-29 PROCEDURE — 1090F PRES/ABSN URINE INCON ASSESS: CPT | Performed by: INTERNAL MEDICINE

## 2025-05-29 PROCEDURE — 1036F TOBACCO NON-USER: CPT | Performed by: INTERNAL MEDICINE

## 2025-05-29 PROCEDURE — 99215 OFFICE O/P EST HI 40 MIN: CPT | Performed by: INTERNAL MEDICINE

## 2025-05-29 PROCEDURE — 1126F AMNT PAIN NOTED NONE PRSNT: CPT | Performed by: INTERNAL MEDICINE

## 2025-05-29 PROCEDURE — 1123F ACP DISCUSS/DSCN MKR DOCD: CPT | Performed by: INTERNAL MEDICINE

## 2025-05-29 PROCEDURE — G8400 PT W/DXA NO RESULTS DOC: HCPCS | Performed by: INTERNAL MEDICINE

## 2025-05-29 PROCEDURE — 3078F DIAST BP <80 MM HG: CPT | Performed by: INTERNAL MEDICINE

## 2025-05-29 PROCEDURE — 3074F SYST BP LT 130 MM HG: CPT | Performed by: INTERNAL MEDICINE

## 2025-05-29 PROCEDURE — G8417 CALC BMI ABV UP PARAM F/U: HCPCS | Performed by: INTERNAL MEDICINE

## 2025-05-29 PROCEDURE — 1159F MED LIST DOCD IN RCRD: CPT | Performed by: INTERNAL MEDICINE

## 2025-05-29 PROCEDURE — G8427 DOCREV CUR MEDS BY ELIG CLIN: HCPCS | Performed by: INTERNAL MEDICINE

## 2025-05-29 PROCEDURE — 1160F RVW MEDS BY RX/DR IN RCRD: CPT | Performed by: INTERNAL MEDICINE

## 2025-05-29 ASSESSMENT — ENCOUNTER SYMPTOMS
PHOTOPHOBIA: 0
EYES NEGATIVE: 1
SHORTNESS OF BREATH: 1
GASTROINTESTINAL NEGATIVE: 1
ALLERGIC/IMMUNOLOGIC NEGATIVE: 1
BACK PAIN: 0
ABDOMINAL PAIN: 0
CHEST TIGHTNESS: 0
EYE PAIN: 0

## 2025-05-29 NOTE — PROGRESS NOTES
Musculoskeletal: Negative.  Negative for back pain and joint swelling.   Skin: Negative.  Negative for rash.   Allergic/Immunologic: Negative.  Negative for immunocompromised state.   Neurological: Negative.  Negative for dizziness, syncope and light-headedness.   Hematological: Negative.  Does not bruise/bleed easily.   Psychiatric/Behavioral: Negative.  Negative for suicidal ideas.             PHYSICAL EXAM:  Physical Exam  Constitutional:       General: She is not in acute distress.     Appearance: She is not ill-appearing.   HENT:      Head: Normocephalic and atraumatic.      Nose: No congestion.      Mouth/Throat:      Mouth: Mucous membranes are moist.   Eyes:      Extraocular Movements: Extraocular movements intact.      Pupils: Pupils are equal, round, and reactive to light.   Cardiovascular:      Rate and Rhythm: Normal rate and regular rhythm.      Heart sounds: Murmur heard.      No friction rub. No gallop.   Pulmonary:      Effort: No respiratory distress.      Breath sounds: No wheezing or rhonchi.   Musculoskeletal:         General: No swelling.      Cervical back: Normal range of motion.      Right lower leg: No edema.      Left lower leg: No edema.   Skin:     General: Skin is warm and dry.      Findings: No rash.   Neurological:      General: No focal deficit present.      Mental Status: She is oriented to person, place, and time.   Psychiatric:         Mood and Affect: Mood normal.         Behavior: Behavior normal.         Judgment: Judgment normal.          BP (!) 102/48   Pulse 64   Ht 1.575 m (5' 2\")   Wt 93.4 kg (206 lb)   BMI 37.68 kg/m²      Wt Readings from Last 10 Encounters:   05/29/25 93.4 kg (206 lb)   04/30/25 94.8 kg (209 lb)   04/16/25 90.7 kg (200 lb)   10/30/24 90.7 kg (200 lb)   09/04/24 89.1 kg (196 lb 6.4 oz)   08/28/24 93.4 kg (206 lb)   04/24/24 93.4 kg (206 lb)   10/18/23 99.8 kg (220 lb)   10/04/23 106.1 kg (234 lb)   04/12/23 106.1 kg (234 lb)           Medical

## 2025-06-04 ENCOUNTER — TELEPHONE (OUTPATIENT)
Dept: CASE MANAGEMENT | Age: 80
End: 2025-06-04

## 2025-06-04 NOTE — TELEPHONE ENCOUNTER
Cardiac Catheterization  Dr. Luc Egan  Name: Indra  When:  Thursday, 6/19, 2025  Where: [facility] ([address] ) front entrance by the [address] and check in at registration on the left  What: Cardiac Catheterization for evaluation of the coronary arteries   Arrival Time: 9:00 am for IV hydration  Procedure Time: 12:30 pm  Medications: Bring a list of your medications with the dosages or bring them in the bottles  Special Instructions:  Do not Eat anything after midnight the night prior to your procedure.             Stop taking any vitamins or supplements (like multi, vitamin D, or calcium), 24 hr before   Hold Eliquis for the cath - last dose on June 16 in the evening   You may take all other medications.    Take four tablets of aspirin 81mg, if you have some at home, on 6/19 am     Mahi, or someone from cath lab, may call you the day prior for any further details. There may be a chance you will stay for the night and plan to have someone drive you home.    Any questions, call Candelaria at [phone]

## 2025-06-18 NOTE — PROGRESS NOTES
Patient pre-assessment complete for [facility] scheduled for Dr Egan, arrival time 0900. Patient verified using .  NPO status reinforced. Patient informed to take a full dose aspirin 325mg  or 81 mg x 4 on the day of procedure. Patient instructed to HOLD Eliquis. Instructed they can take all other medications excluding vitamins & supplements. Patient verbalizes understanding of all instructions & denies any questions at this time.

## 2025-06-19 ENCOUNTER — HOSPITAL ENCOUNTER (OUTPATIENT)
Age: 80
Setting detail: OUTPATIENT SURGERY
Discharge: HOME | End: 2025-06-19
Attending: INTERNAL MEDICINE | Admitting: INTERNAL MEDICINE
Payer: MEDICARE

## 2025-06-19 VITALS
DIASTOLIC BLOOD PRESSURE: 96 MMHG | OXYGEN SATURATION: 98 % | WEIGHT: 206 LBS | HEIGHT: 62 IN | RESPIRATION RATE: 21 BRPM | SYSTOLIC BLOOD PRESSURE: 136 MMHG | TEMPERATURE: 98.2 F | BODY MASS INDEX: 37.91 KG/M2 | HEART RATE: 67 BPM

## 2025-06-19 DIAGNOSIS — I35.0 AORTIC VALVE STENOSIS, SEVERE: ICD-10-CM

## 2025-06-19 LAB
ANION GAP SERPL CALC-SCNC: 12 MMOL/L (ref 7–16)
BASOPHILS # BLD: 0.05 K/UL (ref 0–0.2)
BASOPHILS NFR BLD: 0.6 % (ref 0–2)
BUN SERPL-MCNC: 30 MG/DL (ref 8–23)
CALCIUM SERPL-MCNC: 9.2 MG/DL (ref 8.8–10.2)
CHLORIDE SERPL-SCNC: 111 MMOL/L (ref 98–107)
CO2 SERPL-SCNC: 20 MMOL/L (ref 20–29)
CREAT SERPL-MCNC: 1.86 MG/DL (ref 0.6–1.1)
DIFFERENTIAL METHOD BLD: ABNORMAL
ECHO BSA: 2.02 M2
EKG ATRIAL RATE: 61 BPM
EKG DIAGNOSIS: NORMAL
EKG P AXIS: 70 DEGREES
EKG P-R INTERVAL: 162 MS
EKG Q-T INTERVAL: 478 MS
EKG QRS DURATION: 146 MS
EKG QTC CALCULATION (BAZETT): 481 MS
EKG R AXIS: -33 DEGREES
EKG T AXIS: 47 DEGREES
EKG VENTRICULAR RATE: 61 BPM
EOSINOPHIL # BLD: 0.65 K/UL (ref 0–0.8)
EOSINOPHIL NFR BLD: 7.9 % (ref 0.5–7.8)
ERYTHROCYTE [DISTWIDTH] IN BLOOD BY AUTOMATED COUNT: 14.6 % (ref 11.9–14.6)
GLUCOSE SERPL-MCNC: 104 MG/DL (ref 70–99)
HCT VFR BLD AUTO: 29.6 % (ref 35.8–46.3)
HGB BLD-MCNC: 9.8 G/DL (ref 11.7–15.4)
IMM GRANULOCYTES # BLD AUTO: 0.02 K/UL (ref 0–0.5)
IMM GRANULOCYTES NFR BLD AUTO: 0.2 % (ref 0–5)
LYMPHOCYTES # BLD: 2.55 K/UL (ref 0.5–4.6)
LYMPHOCYTES NFR BLD: 31.1 % (ref 13–44)
MAGNESIUM SERPL-MCNC: 1.5 MG/DL (ref 1.8–2.4)
MCH RBC QN AUTO: 29.7 PG (ref 26.1–32.9)
MCHC RBC AUTO-ENTMCNC: 33.1 G/DL (ref 31.4–35)
MCV RBC AUTO: 89.7 FL (ref 82–102)
MONOCYTES # BLD: 0.61 K/UL (ref 0.1–1.3)
MONOCYTES NFR BLD: 7.4 % (ref 4–12)
NEUTS SEG # BLD: 4.32 K/UL (ref 1.7–8.2)
NEUTS SEG NFR BLD: 52.8 % (ref 43–78)
NRBC # BLD: 0 K/UL (ref 0–0.2)
PLATELET # BLD AUTO: 227 K/UL (ref 150–450)
PMV BLD AUTO: 9.7 FL (ref 9.4–12.3)
POTASSIUM SERPL-SCNC: 4.1 MMOL/L (ref 3.5–5.1)
RBC # BLD AUTO: 3.3 M/UL (ref 4.05–5.2)
SODIUM SERPL-SCNC: 142 MMOL/L (ref 136–145)
WBC # BLD AUTO: 8.2 K/UL (ref 4.3–11.1)

## 2025-06-19 PROCEDURE — 85025 COMPLETE CBC W/AUTO DIFF WBC: CPT

## 2025-06-19 PROCEDURE — 99152 MOD SED SAME PHYS/QHP 5/>YRS: CPT | Performed by: INTERNAL MEDICINE

## 2025-06-19 PROCEDURE — 2709999900 HC NON-CHARGEABLE SUPPLY: Performed by: INTERNAL MEDICINE

## 2025-06-19 PROCEDURE — 80048 BASIC METABOLIC PNL TOTAL CA: CPT

## 2025-06-19 PROCEDURE — 93458 L HRT ARTERY/VENTRICLE ANGIO: CPT | Performed by: INTERNAL MEDICINE

## 2025-06-19 PROCEDURE — 6360000002 HC RX W HCPCS: Performed by: INTERNAL MEDICINE

## 2025-06-19 PROCEDURE — 2580000003 HC RX 258: Performed by: INTERNAL MEDICINE

## 2025-06-19 PROCEDURE — 83735 ASSAY OF MAGNESIUM: CPT

## 2025-06-19 PROCEDURE — 93010 ELECTROCARDIOGRAM REPORT: CPT | Performed by: INTERNAL MEDICINE

## 2025-06-19 PROCEDURE — C1894 INTRO/SHEATH, NON-LASER: HCPCS | Performed by: INTERNAL MEDICINE

## 2025-06-19 PROCEDURE — C1769 GUIDE WIRE: HCPCS | Performed by: INTERNAL MEDICINE

## 2025-06-19 PROCEDURE — 2500000003 HC RX 250 WO HCPCS: Performed by: INTERNAL MEDICINE

## 2025-06-19 PROCEDURE — 93005 ELECTROCARDIOGRAM TRACING: CPT | Performed by: INTERNAL MEDICINE

## 2025-06-19 RX ORDER — HEPARIN SODIUM 200 [USP'U]/100ML
INJECTION, SOLUTION INTRAVENOUS CONTINUOUS PRN
Status: COMPLETED | OUTPATIENT
Start: 2025-06-19 | End: 2025-06-19

## 2025-06-19 RX ORDER — ACETAMINOPHEN 325 MG/1
650 TABLET ORAL EVERY 4 HOURS PRN
OUTPATIENT
Start: 2025-06-19

## 2025-06-19 RX ORDER — SODIUM CHLORIDE 0.9 % (FLUSH) 0.9 %
5-40 SYRINGE (ML) INJECTION EVERY 12 HOURS SCHEDULED
OUTPATIENT
Start: 2025-06-19

## 2025-06-19 RX ORDER — SODIUM CHLORIDE 9 MG/ML
INJECTION, SOLUTION INTRAVENOUS PRN
OUTPATIENT
Start: 2025-06-19

## 2025-06-19 RX ORDER — MIDAZOLAM HYDROCHLORIDE 1 MG/ML
INJECTION, SOLUTION INTRAMUSCULAR; INTRAVENOUS PRN
Status: DISCONTINUED | OUTPATIENT
Start: 2025-06-19 | End: 2025-06-19 | Stop reason: HOSPADM

## 2025-06-19 RX ORDER — ASPIRIN 81 MG/1
324 TABLET, CHEWABLE ORAL ONCE
Status: DISCONTINUED | OUTPATIENT
Start: 2025-06-19 | End: 2025-06-19 | Stop reason: HOSPADM

## 2025-06-19 RX ORDER — LIDOCAINE HYDROCHLORIDE 10 MG/ML
INJECTION, SOLUTION INFILTRATION; PERINEURAL PRN
Status: DISCONTINUED | OUTPATIENT
Start: 2025-06-19 | End: 2025-06-19 | Stop reason: HOSPADM

## 2025-06-19 RX ORDER — SODIUM CHLORIDE 0.9 % (FLUSH) 0.9 %
5-40 SYRINGE (ML) INJECTION PRN
OUTPATIENT
Start: 2025-06-19

## 2025-06-19 RX ORDER — SODIUM CHLORIDE 9 MG/ML
INJECTION, SOLUTION INTRAVENOUS CONTINUOUS
Status: DISCONTINUED | OUTPATIENT
Start: 2025-06-19 | End: 2025-06-19 | Stop reason: HOSPADM

## 2025-06-19 RX ADMIN — SODIUM CHLORIDE: 9 INJECTION, SOLUTION INTRAVENOUS at 09:49

## 2025-06-19 ASSESSMENT — ENCOUNTER SYMPTOMS
BACK PAIN: 0
SORE THROAT: 0
COLOR CHANGE: 0
LEFT EYE: 0
WHEEZING: 0
ABDOMINAL PAIN: 0
DIARRHEA: 0
SHORTNESS OF BREATH: 0
SPUTUM PRODUCTION: 0
ORTHOPNEA: 0
RIGHT EYE: 0
COUGH: 0
HEARTBURN: 0
BLURRED VISION: 0
HEMOPTYSIS: 0
CONSTIPATION: 0
HEMATEMESIS: 0
VOMITING: 0
NAUSEA: 0

## 2025-06-19 ASSESSMENT — EJECTION FRACTION: EF_VALUE: .38

## 2025-06-19 NOTE — CONSULTS
MD Immanuel Montenegro MD R. Grant Willis, MD, MS          CONSULT NOTE    Precious Burrell         1945    REFERRING PHYSICIAN:  Dr. Egan       HISTORY OF PRESENT ILLNESS:  The patient is a 79 y.o. female who is seen for evaluation of aortic stenosis. She has noted some MARTELL. She denies any chest discomfort, dizziness or lightheadedness. Echocardiogram in April showed progression of AS to severe with mean gradient of 43 mmHg and COMPA of 0.94cm2. She had mild MR. LV EF was normal. She has known CAD with prior PCI. She underwent LHC today that showed patent RCA stent and otherwise non-obstructive CAD.   She has a history of a-fib. She ambulates with a cane.     Past Medical History:   Diagnosis Date    Abnormal stress test     Arthritis     AS (aortic stenosis)     Asthma 2015    Atrial septal aneurysm     CAD (coronary artery disease)     Chest pain 2015    Chronic kidney disease     Chronic venous insufficiency     COPD (chronic obstructive pulmonary disease) ([facility]) 2015    Diabetes ([facility])     Essential hypertension 2015    History of left heart catheterization (LHC)      per pt - normal    Hyperlipidemia 2015    Mixed hyperlipidemia 2015    PAF (paroxysmal atrial fibrillation) ([facility]) 2015    Palpitations 2015    Renal insufficiency     Sleep apnea 2015    w CPAP    Thyroid disease        Past Surgical History:   Procedure Laterality Date     SECTION      HYSTERECTOMY (CERVIX STATUS UNKNOWN)         History reviewed. No pertinent family history.    Social History     Socioeconomic History    Marital status: Unknown     Spouse name: Not on file    Number of children: Not on file    Years of education: Not on file    Highest education level: Not on file   Occupational History    Not on file   Tobacco Use    Smoking status: Never    Smokeless tobacco: Never   Substance and Sexual Activity    Alcohol use: No

## 2025-06-19 NOTE — PROGRESS NOTES
Patient received to CPRU room # 11.  Ambulatory from [facility]. Patient scheduled for [facility] today with Dr Egan. Procedure reviewed & questions answered, voiced good understanding consent obtained & placed on chart. All medications and medical history reviewed. Will prep patient per orders. Patient & family updated on plan of care.      The patient has a fraility score of 6-MODERATELY FRAIL, based on pt requiring assistive device for all mobility and requires assistance to complete most ADLs.

## 2025-06-19 NOTE — PROGRESS NOTES
[facility] w/ Dr. Egan  No interventions: TAVR workup  R radial access  TR band to R radial @ 12 mL  No s/sxs of bleeding or hematoma to R radial access site    Heparin 5000 units  Versed 2 mg  Fentanyl 50 mcg  Report called to GLENNY Sims in CPRU

## 2025-06-19 NOTE — PROGRESS NOTES
Radial compression band removed at 1400 after slowly reducing air to zero as per hospital protocol.  No bleeding or hematoma noted. 2 x 2 gauze with tegaderm placed over puncture site. The affected extremity is warm and dry to the touch. Frequent vital signs printed and placed on bedside chart.  Patient instructed to call if any bleeding noted on gauze.  Patient verbalized understanding the nursing instructions.

## 2025-06-19 NOTE — DISCHARGE INSTRUCTIONS
HEART CATHETERIZATION/ANGIOGRAPHY DISCHARGE INSTRUCTIONS    Check puncture site frequently for swelling or bleeding. If there is any bleeding, apply pressure over the area with a clean towel or washcloth and call 911. Notify your doctor for any redness, swelling, drainage, or oozing from the puncture site. Notify your doctor for any fever or chills.  If the extremity becomes cold, numb, or painful call [facility] at [phone]  Activity should be limited for the next 48 hours. No heavy lifting, pushing, pulling  or strenuous activity for 48 hours. No heavy lifting (anything over 10 pounds) for 3 days.  You may resume your usual diet. Drink more fluids than usual.  Have a responsible person drive you home and stay with you for at least 24 hours after your heart catheterization/angiography.  You may remove bandage from your Right wrist in 24 hours. You may shower in 24 hours. No tub baths, hot tubs, or swimming for 1 week. Do not place any lotions, creams, powders, or ointments over puncture site for 1 week. You may place a clean band-aid over the puncture site each day for 5 days. Change daily.        Sedation for a Medical Procedure: Care Instructions     You were given a sedative medication during your visit. While many of the effects will have worn   off before you leave; you may continue to feel some effects for several hours.      Common side effects from sedation include:  Feeling sleepy. (Your doctors and nurses will make sure you are not too sleepy to go home.)  Nausea and vomiting. This usually does not last long.  Feeling tired.     How can you care for yourself at home?  Activity    Don't do anything for 24 hours that requires attention to detail. It takes time for the medicine effects to completely wear off.     Do not make important legal decisions for 24 hours.     Do not sign any legal documents for 24 hours.     Do not drink alcohol today     For your safety, you should not drive or operate

## 2025-06-19 NOTE — PROGRESS NOTES
TRANSFER - IN REPORT:    Verbal report received from GLENNY Still on Precious Burrell being received from cath lab for routine progression of patient care      Report consisted of patient’s Situation, Background, Assessment and Recommendations(SBAR).     Information from the following report(s) Procedure Summary was reviewed with the receiving nurse.    Opportunity for questions and clarification was provided.      Assessment completed upon patient’s arrival to unit and care assumed.

## 2025-06-19 NOTE — NURSE NAVIGATOR
Preadmission Testing for TAVR  Name: Indra  When: Monday, July 14  Time: Arrive no later than 7:15 am  Where: ([facility]) [facility]                [address] 3rd floor [address]  Instructions: do not take Eliquis on July 13, 14, 15   Eat breakfast before arrival on 7/14.    Take your morning medications before you arrive.                    Do not take vitamins or supplements on Monday or Tuesday.       Bring your medications bottles with you so we can verify them.       Bring someone with you to the visit if available.      Bring your insurance cards, 's license, Health Care Power of  or Living Will      Plan for a 1-2hour visit  At this appointment you will have non fasting labs drawn, chest X-ray completed, EKG, and a breathing test (pulmonary function test-if not completed at the CT appt) completed.    Transcatheter Aortic Valve Replacement (TAVR)   When: Tuesday, July 15  Arrival time: Someone from preop will call you on Monday, the day prior to your procedure, around lunch to let you know exactly what time to be at the hospital on Tuesday.   Where: [facility], [address]   On Tuesday, you will come to the hospital ([address]) at the front entrance (the statue [facility]), check in at registration on the left just inside the doors, and they will direct you where to go.      Plan to stay in the hospital at least 1 night, maybe 2.    Call Candelaria for any questions [phone]

## 2025-06-20 ENCOUNTER — TELEPHONE (OUTPATIENT)
Dept: CASE MANAGEMENT | Age: 80
End: 2025-06-20

## 2025-06-20 NOTE — TELEPHONE ENCOUNTER
6/19 1700:  Pt called to say she just got home and is bleeding from her right wrist. Her son has applied pressure but she was scared and worried. Instructed the pt to hang up and call 911 for evaluation.    6/20:  Spoke with pt this morning who stated EMS came and evaluated the site and placed a dressing over the site, bleeding had stopped and she did not go to the hospital. She feels well this morning, no bleeding to right arm and doesn't feel like she needs a FU appt but will let me know if this changes. She stills has the dressing applied by EMS from yesterday. No bleeding during the night, she has full sensation of her hand and fingers, no numbness, tingling, or swelling. Informed pt she may have more bruising there as time goes by. She didn't take her Eliquis last night but started it back today.   She will let me know if she needs an appt with Dr. Downey or nurse visit at the [facility] on Wednesday.

## 2025-07-14 ENCOUNTER — HOSPITAL ENCOUNTER (OUTPATIENT)
Dept: GENERAL RADIOLOGY | Age: 80
Discharge: HOME | End: 2025-07-17
Payer: MEDICARE

## 2025-07-14 ENCOUNTER — HOSPITAL ENCOUNTER (OUTPATIENT)
Dept: LAB | Age: 80
Setting detail: SPECIMEN
Discharge: HOME | DRG: 267 | End: 2025-07-16
Payer: MEDICARE

## 2025-07-14 ENCOUNTER — HOSPITAL ENCOUNTER (OUTPATIENT)
Dept: SURGERY | Age: 80
Discharge: HOME | End: 2025-07-17
Payer: MEDICARE

## 2025-07-14 VITALS
HEART RATE: 65 BPM | WEIGHT: 216.4 LBS | OXYGEN SATURATION: 94 % | BODY MASS INDEX: 39.82 KG/M2 | RESPIRATION RATE: 18 BRPM | SYSTOLIC BLOOD PRESSURE: 128 MMHG | HEIGHT: 62 IN | DIASTOLIC BLOOD PRESSURE: 45 MMHG | TEMPERATURE: 97.5 F

## 2025-07-14 DIAGNOSIS — I35.0 AORTIC VALVE STENOSIS, SEVERE: ICD-10-CM

## 2025-07-14 DIAGNOSIS — Z01.818 PREOP TESTING: ICD-10-CM

## 2025-07-14 LAB
ALBUMIN SERPL-MCNC: 3.1 G/DL (ref 3.2–4.6)
ALBUMIN/GLOB SERPL: 0.8 (ref 1–1.9)
ALP SERPL-CCNC: 56 U/L (ref 35–104)
ALT SERPL-CCNC: <5 U/L (ref 8–45)
ANION GAP SERPL CALC-SCNC: 13 MMOL/L (ref 7–16)
AST SERPL-CCNC: 12 U/L (ref 15–37)
BILIRUB SERPL-MCNC: 0.4 MG/DL (ref 0–1.2)
BUN SERPL-MCNC: 26 MG/DL (ref 8–23)
CALCIUM SERPL-MCNC: 8.7 MG/DL (ref 8.8–10.2)
CHLORIDE SERPL-SCNC: 106 MMOL/L (ref 98–107)
CO2 SERPL-SCNC: 20 MMOL/L (ref 20–29)
CREAT SERPL-MCNC: 1.73 MG/DL (ref 0.6–1.1)
EKG ATRIAL RATE: 59 BPM
EKG DIAGNOSIS: NORMAL
EKG P AXIS: 22 DEGREES
EKG P-R INTERVAL: 162 MS
EKG Q-T INTERVAL: 486 MS
EKG QRS DURATION: 156 MS
EKG QTC CALCULATION (BAZETT): 481 MS
EKG R AXIS: -37 DEGREES
EKG T AXIS: 48 DEGREES
EKG VENTRICULAR RATE: 59 BPM
ERYTHROCYTE [DISTWIDTH] IN BLOOD BY AUTOMATED COUNT: 15.6 % (ref 11.9–14.6)
EST. AVERAGE GLUCOSE BLD GHB EST-MCNC: 113 MG/DL
GLOBULIN SER CALC-MCNC: 3.7 G/DL (ref 2.3–3.5)
GLUCOSE SERPL-MCNC: 91 MG/DL (ref 70–99)
HBA1C MFR BLD: 5.6 % (ref 0–5.6)
HCT VFR BLD AUTO: 30 % (ref 35.8–46.3)
HGB BLD-MCNC: 9.5 G/DL (ref 11.7–15.4)
HISTORY CHECK: NORMAL
INR PPP: 1.3
MAGNESIUM SERPL-MCNC: 1.7 MG/DL (ref 1.8–2.4)
MCH RBC QN AUTO: 29.1 PG (ref 26.1–32.9)
MCHC RBC AUTO-ENTMCNC: 31.7 G/DL (ref 31.4–35)
MCV RBC AUTO: 92 FL (ref 82–102)
MRSA DNA SPEC QL NAA+PROBE: NOT DETECTED
NRBC # BLD: 0 K/UL (ref 0–0.2)
PLATELET # BLD AUTO: 228 K/UL (ref 150–450)
PMV BLD AUTO: 10.1 FL (ref 9.4–12.3)
POTASSIUM SERPL-SCNC: 3.2 MMOL/L (ref 3.5–5.1)
PROT SERPL-MCNC: 6.7 G/DL (ref 6.3–8.2)
PROTHROMBIN TIME: 16.5 SEC (ref 11.3–14.9)
RBC # BLD AUTO: 3.26 M/UL (ref 4.05–5.2)
S AUREUS CPE NOSE QL NAA+PROBE: DETECTED
SODIUM SERPL-SCNC: 139 MMOL/L (ref 136–145)
WBC # BLD AUTO: 9 K/UL (ref 4.3–11.1)

## 2025-07-14 PROCEDURE — 71046 X-RAY EXAM CHEST 2 VIEWS: CPT

## 2025-07-14 PROCEDURE — 80053 COMPREHEN METABOLIC PANEL: CPT

## 2025-07-14 PROCEDURE — 86920 COMPATIBILITY TEST SPIN: CPT

## 2025-07-14 PROCEDURE — 83735 ASSAY OF MAGNESIUM: CPT

## 2025-07-14 PROCEDURE — 93010 ELECTROCARDIOGRAM REPORT: CPT | Performed by: INTERNAL MEDICINE

## 2025-07-14 PROCEDURE — 85610 PROTHROMBIN TIME: CPT

## 2025-07-14 PROCEDURE — 86850 RBC ANTIBODY SCREEN: CPT

## 2025-07-14 PROCEDURE — 83036 HEMOGLOBIN GLYCOSYLATED A1C: CPT

## 2025-07-14 PROCEDURE — 86900 BLOOD TYPING SEROLOGIC ABO: CPT

## 2025-07-14 PROCEDURE — 85027 COMPLETE CBC AUTOMATED: CPT

## 2025-07-14 PROCEDURE — 87641 MR-STAPH DNA AMP PROBE: CPT

## 2025-07-14 PROCEDURE — 93005 ELECTROCARDIOGRAM TRACING: CPT

## 2025-07-14 PROCEDURE — 94010 BREATHING CAPACITY TEST: CPT

## 2025-07-14 PROCEDURE — 86901 BLOOD TYPING SEROLOGIC RH(D): CPT

## 2025-07-14 RX ORDER — LANOLIN ALCOHOL/MO/W.PET/CERES
1000 CREAM (GRAM) TOPICAL DAILY
COMMUNITY

## 2025-07-14 RX ORDER — ASPIRIN 81 MG/1
81 TABLET ORAL DAILY
COMMUNITY

## 2025-07-14 RX ORDER — FUROSEMIDE 20 MG/1
20 TABLET ORAL DAILY
COMMUNITY

## 2025-07-14 ASSESSMENT — PAIN DESCRIPTION - LOCATION: LOCATION: HIP;LEG

## 2025-07-14 ASSESSMENT — PAIN DESCRIPTION - PAIN TYPE: TYPE: CHRONIC PAIN

## 2025-07-14 ASSESSMENT — PAIN SCALES - GENERAL: PAINLEVEL_OUTOF10: 6

## 2025-07-14 ASSESSMENT — PAIN DESCRIPTION - FREQUENCY: FREQUENCY: CONTINUOUS

## 2025-07-14 NOTE — PERIOP NOTE
Patient verified name and . Patient provided medical/health information and PTA medications to the best of their ability.    TYPE  CASE: 3  Order for consent was found in EHR and does match case posting.   Labs per surgeon: Blood bank labs, T&S, PT/INR, Mg, CMP, CBC, A1c, MRSA; results pending  Labs per anesthesia protocol: CBC,BMP, T&S; results pending  EK25    MRSA/MSSA swab collected per policy. MD to consult pharmacy to dose Vanc if appropriate.     Patient provided with and instructed on educational handouts including Heart Guide to Surgery, blood transfusions, pain management, central line infection prevention, being on a ventilator and hand hygiene for the family and community, and [facility] brochure. Instructed that family must be present in building at all times.      Bedside spirometry completed as ordered.    Patient instructed on incentive spirometer    Instructed on chest-xray procedure. Instructed on medications.  Surgical skin cleanser provided and instructions given per hospital policy.    Original medication prescription bottles was visualized during patient appointment.   Patient instructed to take aspirin 81 mg while holding Eliquis, hx of stent, per anesthesia protocol.  Message left for WILFRIDO Thomas RN notifying aspirin 81 mg started per anesthesia protocol and to please contact the patient if she should not take.     Patient teach back successful and patient demonstrates knowledge of instruction.

## 2025-07-14 NOTE — PERIOP NOTE
PLEASE CONTINUE TAKING ALL PRESCRIPTION MEDICATIONS UP TO THE DAY OF SURGERY UNLESS OTHERWISE DIRECTED BELOW. You may take Tylenol, allergy,  and/or indigestion medications.     TAKE ONLY THESE MEDICATIONS ON THE DAY OF SURGERY    Aspirin 81 mg while holding apixaban   Albuterol inhaler if needed and bring it to the hospital   Allopurinol    Multaq    Trelegy inhaler    Metoprolol    Montelukast    Omeprazole      DISCONTINUE all vitamins and supplements now for surgery. DISCONTINUE Non-Steroidal Anti-Inflammatory (NSAIDS) such as Advil and Aleve 5 days prior to surgery.     Home Medications to Hold- please continue all other medications except these.    Apixaban- hold as instructed by surgeon.  Patient stated \"last dose Saturday\"- 7/12/25 and was instructed to hold 7/13, 7/14 and 7/15     Do NOT take the morning of surgery:  olmesartan, lasix     Comments      On the day before surgery please take 2 Tylenol in the morning and then again before bed. You may use either regular or extra strength.           Please do not bring home medications with you on the day of surgery unless otherwise directed by your nurse.  If you are instructed to bring home medications, please give them to your nurse as they will be administered by the nursing staff.    If you have any questions, please call [facility] [phone].    A copy of this note was provided to the patient for reference.

## 2025-07-14 NOTE — NURSE NAVIGATOR
STS score for TVT registry:  Procedure Type: Isolated AVR   Perioperative Outcome Estimate %   Operative Mortality 4.73%   Morbidity & Mortality 19.5%   Stroke 1.32%   Renal Failure 9.25%   Reoperation 3.41%   Prolonged Ventilation 11.7%   Deep Sternal Wound Infection 0.105%   Long Hospital Stay (>14 days) 13.6%   Short Hospital Stay (<6 days)* 19%         Clinical Summary       Planned Surgery: Isolated AVR, Elective, First cardiovascular surgery   Demographics: 79 year old, White, female, 93.4kg, 157.5cm, BMI: 37.6 kg/m²   Insurance/Payor: Medicare   Lab Values: Creatinine: 1.73 mg/dL, Hematocrit: 30%, WBC Count: 9 10³/?L, Platelet Count: 199878 cells/?L   PreOp Medications: Oral diabetes control   Substance Abuse: Never smoker   Risk Factors / Comorbidities: Diabetes Mellitus , Hypertension   Pulmonary RF: Severity Unknown CLD, Sleep Apnea   Cardiac Status: NYHA Class III, Ejection Fraction = 53%   Coronary Artery Disease: Other   Valve Disease: Aortic Stenosis, Mild MR, Mild TR   Arrhythmia: Recent A-fib, Paroxysmal

## 2025-07-14 NOTE — PERIOP NOTE
Latest Reference Range & Units 07/14/25 08:32   Sodium 136 - 145 mmol/L 139   Potassium 3.5 - 5.1 mmol/L 3.2 (L)   Chloride 98 - 107 mmol/L 106   CARBON DIOXIDE 20 - 29 mmol/L 20   BUN,BUNPL 8 - 23 MG/DL 26 (H)   Creatinine 0.60 - 1.10 MG/DL 1.73 (H)   Anion Gap 7 - 16 mmol/L 13   Est, Glom Filt Rate >60 ml/min/1.73m2 30 (L)   Magnesium 1.8 - 2.4 mg/dL 1.7 (L)   Glucose 70 - 99 mg/dL 91   Calcium 8.8 - 10.2 MG/DL 8.7 (L)   Albumin/Globulin Ratio 1.0 - 1.9   0.8 (L)   Total Protein 6.3 - 8.2 g/dL 6.7   Albumin 3.2 - 4.6 g/dL 3.1 (L)   Globulin 2.3 - 3.5 g/dL 3.7 (H)   Alkaline Phosphatase 35 - 104 U/L 56   ALT 8 - 45 U/L <5 (L)   AST 15 - 37 U/L 12 (L)   Total Bilirubin 0.0 - 1.2 MG/DL 0.4   Hemoglobin A1C 0 - 5.6 % 5.6   eAG (mg/dL) mg/dL 113   WBC 4.3 - 11.1 K/uL 9.0   RBC 4.05 - 5.2 M/uL 3.26 (L)   Hemoglobin Quant 11.7 - 15.4 g/dL 9.5 (L)   Hematocrit 35.8 - 46.3 % 30.0 (L)   MCV 82.0 - 102.0 FL 92.0   MCH 26.1 - 32.9 PG 29.1   MCHC 31.4 - 35.0 g/dL 31.7   MPV 9.4 - 12.3 FL 10.1   RDW 11.9 - 14.6 % 15.6 (H)   Platelet Count 150 - 450 K/uL 228   Nucleated Red Blood Cells 0.0 - 0.2 K/uL 0.00   Prothrombin Time 11.3 - 14.9 sec 16.5 (H)   INR -   1.3   MRSA/STAPH AUREUS DNA  Rpt (IP)   (L): Data is abnormally low  (H): Data is abnormally high  (IP): In Process  Rpt: View report in Results Review for more information      CXR:  no acute findings in the chest    EKG:  no significant change    CBC, BMP within anesthesia guidelines, DR. Mcknight notified of PT 16.5, INR 1.3 WNL and Mg 1.7.  no orders received.  EVY Thomas RN notified of Mg 1.7, Hgb 9.5, PT 16.5. Labs automatically routed to ordering provider via Epic documentation.

## 2025-07-15 ENCOUNTER — APPOINTMENT (OUTPATIENT)
Dept: NON INVASIVE DIAGNOSTICS | Age: 80
DRG: 267 | End: 2025-07-15
Attending: INTERNAL MEDICINE
Payer: MEDICARE

## 2025-07-15 ENCOUNTER — HOSPITAL ENCOUNTER (INPATIENT)
Age: 80
LOS: 1 days | Discharge: HOME | DRG: 267 | End: 2025-07-16
Attending: INTERNAL MEDICINE | Admitting: INTERNAL MEDICINE
Payer: MEDICARE

## 2025-07-15 ENCOUNTER — ANESTHESIA (OUTPATIENT)
Dept: SURGERY | Age: 80
DRG: 267 | End: 2025-07-15
Payer: MEDICARE

## 2025-07-15 ENCOUNTER — ANESTHESIA EVENT (OUTPATIENT)
Dept: SURGERY | Age: 80
DRG: 267 | End: 2025-07-15
Payer: MEDICARE

## 2025-07-15 DIAGNOSIS — I35.0 AORTIC VALVE STENOSIS, ETIOLOGY OF CARDIAC VALVE DISEASE UNSPECIFIED: ICD-10-CM

## 2025-07-15 DIAGNOSIS — Z95.2 HISTORY OF TRANSCATHETER AORTIC VALVE REPLACEMENT (TAVR): Primary | ICD-10-CM

## 2025-07-15 DIAGNOSIS — I35.0 AORTIC VALVE STENOSIS, SEVERE: ICD-10-CM

## 2025-07-15 DIAGNOSIS — Z01.818 PREOP TESTING: ICD-10-CM

## 2025-07-15 LAB
ACT AVERAGE - AAVG: 276 SEC
ANION GAP BLD CALC-SCNC: ABNORMAL MMOL/L
ANION GAP BLD CALC-SCNC: ABNORMAL MMOL/L
BASE DEFICIT BLD-SCNC: 4.3 MMOL/L
BASE DEFICIT BLD-SCNC: 5.8 MMOL/L
BASELINE ACT: 153 SEC
BASELINE ACT: 153 SEC
CA-I BLD-MCNC: 1.23 MMOL/L (ref 1.12–1.32)
CA-I BLD-MCNC: 1.26 MMOL/L (ref 1.12–1.32)
CO2 BLD-SCNC: 19 MMOL/L (ref 13–23)
CO2 BLD-SCNC: 21 MMOL/L (ref 13–23)
ECHO AR MAX VEL PISA: 3.8 M/S
ECHO AV AREA PEAK VELOCITY: 1 CM2
ECHO AV AREA VTI: 1.1 CM2
ECHO AV AREA/BSA PEAK VELOCITY: 0.5 CM2/M2
ECHO AV AREA/BSA VTI: 0.6 CM2/M2
ECHO AV MEAN GRADIENT: 4 MMHG
ECHO AV MEAN VELOCITY: 0.9 M/S
ECHO AV PEAK GRADIENT: 7 MMHG
ECHO AV PEAK VELOCITY: 1.3 M/S
ECHO AV REGURGITANT PHT: 637 MS
ECHO AV VELOCITY RATIO: 0.54
ECHO AV VTI: 25.7 CM
ECHO BSA: 2.07 M2
ECHO BSA: 2.07 M2
ECHO LV EF PHYSICIAN: 50 %
ECHO LVOT AREA: 3.5 CM2
ECHO LVOT AV VTI INDEX: 0.8
ECHO LVOT DIAM: 2.1 CM
ECHO LVOT MEAN GRADIENT: 1 MMHG
ECHO LVOT PEAK GRADIENT: 2 MMHG
ECHO LVOT PEAK VELOCITY: 0.7 M/S
ECHO LVOT STROKE VOLUME INDEX: 36 ML/M2
ECHO LVOT SV: 71.3 ML
ECHO LVOT VTI: 20.6 CM
GLUCOSE BLD STRIP.AUTO-MCNC: 100 MG/DL (ref 65–100)
GLUCOSE BLD STRIP.AUTO-MCNC: 90 MG/DL (ref 65–100)
GLUCOSE BLD STRIP.AUTO-MCNC: 92 MG/DL (ref 65–100)
HCO3 BLD-SCNC: 19.7 MMOL/L (ref 21–28)
HCO3 BLD-SCNC: 21.7 MMOL/L (ref 21–28)
HEPARIN BOLUS-PATIENT: NORMAL UNITS
HEPARIN BOLUS-PATIENT: NORMAL UNITS
HEPARIN BOLUS-PUMP: 0 UNITS
HEPARIN BOLUS-PUMP: 0 UNITS
HEPARIN BOLUS-TOTAL: NORMAL UNITS
HEPARIN BOLUS-TOTAL: NORMAL UNITS
HEPARIN TEST CONCENTRATE: 2 MG/KG
NT PRO BNP: 2040 PG/ML (ref 0–450)
PCO2 BLD: 37.6 MMHG (ref 35–45)
PCO2 BLD: 42.7 MMHG (ref 35–45)
PH BLD: 7.31 (ref 7.35–7.45)
PH BLD: 7.33 (ref 7.35–7.45)
PO2 BLD: 157 MMHG (ref 75–100)
PO2 BLD: 306 MMHG (ref 75–100)
POTASSIUM BLD-SCNC: 3.4 MMOL/L (ref 3.5–5.1)
POTASSIUM BLD-SCNC: 3.5 MMOL/L (ref 3.5–5.1)
POTASSIUM BLD-SCNC: 3.5 MMOL/L (ref 3.5–5.1)
PROJECTED HEPARIN CONCENTATION: 1.8 MG/KG
PROJECTED HEPARIN CONCENTATION: 1.8 MG/KG
SAO2 % BLD: 100 %
SAO2 % BLD: 99 %
SERVICE CMNT-IMP: ABNORMAL
SERVICE CMNT-IMP: ABNORMAL
SERVICE CMNT-IMP: NORMAL
SLOPE: 130
SLOPE: 130
SODIUM BLD-SCNC: 145 MMOL/L (ref 136–145)
SODIUM BLD-SCNC: 147 MMOL/L (ref 136–145)
SPECIMEN SITE: ABNORMAL
SPECIMEN SITE: ABNORMAL

## 2025-07-15 PROCEDURE — 2500000003 HC RX 250 WO HCPCS: Performed by: INTERNAL MEDICINE

## 2025-07-15 PROCEDURE — C1769 GUIDE WIRE: HCPCS | Performed by: INTERNAL MEDICINE

## 2025-07-15 PROCEDURE — 2140000000 HC CCU INTERMEDIATE R&B

## 2025-07-15 PROCEDURE — 6360000002 HC RX W HCPCS: Performed by: INTERNAL MEDICINE

## 2025-07-15 PROCEDURE — 02RF38Z REPLACEMENT OF AORTIC VALVE WITH ZOOPLASTIC TISSUE, PERCUTANEOUS APPROACH: ICD-10-PCS | Performed by: SURGERY

## 2025-07-15 PROCEDURE — 2720000010 HC SURG SUPPLY STERILE: Performed by: INTERNAL MEDICINE

## 2025-07-15 PROCEDURE — 82803 BLOOD GASES ANY COMBINATION: CPT

## 2025-07-15 PROCEDURE — C1889 IMPLANT/INSERT DEVICE, NOC: HCPCS | Performed by: INTERNAL MEDICINE

## 2025-07-15 PROCEDURE — 6360000002 HC RX W HCPCS: Performed by: ANESTHESIOLOGY

## 2025-07-15 PROCEDURE — 2580000003 HC RX 258: Performed by: ANESTHESIOLOGY

## 2025-07-15 PROCEDURE — 82962 GLUCOSE BLOOD TEST: CPT

## 2025-07-15 PROCEDURE — 2580000003 HC RX 258: Performed by: INTERNAL MEDICINE

## 2025-07-15 PROCEDURE — 6370000000 HC RX 637 (ALT 250 FOR IP): Performed by: ANESTHESIOLOGY

## 2025-07-15 PROCEDURE — 94760 N-INVAS EAR/PLS OXIMETRY 1: CPT

## 2025-07-15 PROCEDURE — 3700000001 HC ADD 15 MINUTES (ANESTHESIA): Performed by: INTERNAL MEDICINE

## 2025-07-15 PROCEDURE — 83880 ASSAY OF NATRIURETIC PEPTIDE: CPT

## 2025-07-15 PROCEDURE — 6370000000 HC RX 637 (ALT 250 FOR IP): Performed by: INTERNAL MEDICINE

## 2025-07-15 PROCEDURE — 3600000007 HC SURGERY HYBRID BASE: Performed by: INTERNAL MEDICINE

## 2025-07-15 PROCEDURE — 2500000003 HC RX 250 WO HCPCS

## 2025-07-15 PROCEDURE — C1760 CLOSURE DEV, VASC: HCPCS | Performed by: INTERNAL MEDICINE

## 2025-07-15 PROCEDURE — 93321 DOPPLER ECHO F-UP/LMTD STD: CPT

## 2025-07-15 PROCEDURE — 7100000001 HC PACU RECOVERY - ADDTL 15 MIN: Performed by: INTERNAL MEDICINE

## 2025-07-15 PROCEDURE — 33361 REPLACE AORTIC VALVE PERQ: CPT | Performed by: SURGERY

## 2025-07-15 PROCEDURE — 85520 HEPARIN ASSAY: CPT

## 2025-07-15 PROCEDURE — 94640 AIRWAY INHALATION TREATMENT: CPT

## 2025-07-15 PROCEDURE — 82947 ASSAY GLUCOSE BLOOD QUANT: CPT

## 2025-07-15 PROCEDURE — C1894 INTRO/SHEATH, NON-LASER: HCPCS | Performed by: INTERNAL MEDICINE

## 2025-07-15 PROCEDURE — 7100000000 HC PACU RECOVERY - FIRST 15 MIN: Performed by: INTERNAL MEDICINE

## 2025-07-15 PROCEDURE — 84132 ASSAY OF SERUM POTASSIUM: CPT

## 2025-07-15 PROCEDURE — 84295 ASSAY OF SERUM SODIUM: CPT

## 2025-07-15 PROCEDURE — 33361 REPLACE AORTIC VALVE PERQ: CPT | Performed by: INTERNAL MEDICINE

## 2025-07-15 PROCEDURE — 93308 TTE F-UP OR LMTD: CPT | Performed by: INTERNAL MEDICINE

## 2025-07-15 PROCEDURE — 2700000000 HC OXYGEN THERAPY PER DAY

## 2025-07-15 PROCEDURE — 02RF38Z REPLACEMENT OF AORTIC VALVE WITH ZOOPLASTIC TISSUE, PERCUTANEOUS APPROACH: ICD-10-PCS | Performed by: INTERNAL MEDICINE

## 2025-07-15 PROCEDURE — 85347 COAGULATION TIME ACTIVATED: CPT

## 2025-07-15 PROCEDURE — 93325 DOPPLER ECHO COLOR FLOW MAPG: CPT | Performed by: INTERNAL MEDICINE

## 2025-07-15 PROCEDURE — 3700000000 HC ANESTHESIA ATTENDED CARE: Performed by: INTERNAL MEDICINE

## 2025-07-15 PROCEDURE — C1713 ANCHOR/SCREW BN/BN,TIS/BN: HCPCS | Performed by: INTERNAL MEDICINE

## 2025-07-15 PROCEDURE — 2709999900 HC NON-CHARGEABLE SUPPLY: Performed by: INTERNAL MEDICINE

## 2025-07-15 PROCEDURE — 82330 ASSAY OF CALCIUM: CPT

## 2025-07-15 PROCEDURE — 6360000002 HC RX W HCPCS

## 2025-07-15 PROCEDURE — 3600000017 HC SURGERY HYBRID ADDL 15MIN: Performed by: INTERNAL MEDICINE

## 2025-07-15 PROCEDURE — C1725 CATH, TRANSLUMIN NON-LASER: HCPCS | Performed by: INTERNAL MEDICINE

## 2025-07-15 PROCEDURE — 93321 DOPPLER ECHO F-UP/LMTD STD: CPT | Performed by: INTERNAL MEDICINE

## 2025-07-15 DEVICE — ANGIO-SEAL VIP VASCULAR CLOSURE DEVICE
Type: IMPLANTABLE DEVICE | Status: FUNCTIONAL
Brand: ANGIO-SEAL

## 2025-07-15 DEVICE — VALVE AORT SAPIEN 3 ULTRA RESILIA 26MM: Type: IMPLANTABLE DEVICE | Site: HEART | Status: FUNCTIONAL

## 2025-07-15 RX ORDER — FENTANYL CITRATE 50 UG/ML
25 INJECTION, SOLUTION INTRAMUSCULAR; INTRAVENOUS EVERY 5 MIN PRN
Status: DISCONTINUED | OUTPATIENT
Start: 2025-07-15 | End: 2025-07-15 | Stop reason: HOSPADM

## 2025-07-15 RX ORDER — MONTELUKAST SODIUM 10 MG/1
10 TABLET ORAL DAILY
Status: DISCONTINUED | OUTPATIENT
Start: 2025-07-16 | End: 2025-07-16 | Stop reason: HOSPADM

## 2025-07-15 RX ORDER — HALOPERIDOL 5 MG/ML
1 INJECTION INTRAMUSCULAR
Status: DISCONTINUED | OUTPATIENT
Start: 2025-07-15 | End: 2025-07-15 | Stop reason: HOSPADM

## 2025-07-15 RX ORDER — SODIUM CHLORIDE 0.9 % (FLUSH) 0.9 %
5-40 SYRINGE (ML) INJECTION EVERY 12 HOURS SCHEDULED
Status: DISCONTINUED | OUTPATIENT
Start: 2025-07-15 | End: 2025-07-15 | Stop reason: HOSPADM

## 2025-07-15 RX ORDER — SODIUM CHLORIDE 0.9 % (FLUSH) 0.9 %
5-40 SYRINGE (ML) INJECTION EVERY 12 HOURS SCHEDULED
Status: DISCONTINUED | OUTPATIENT
Start: 2025-07-15 | End: 2025-07-16 | Stop reason: HOSPADM

## 2025-07-15 RX ORDER — METHIMAZOLE 5 MG/1
5 TABLET ORAL
Status: DISCONTINUED | OUTPATIENT
Start: 2025-07-15 | End: 2025-07-16 | Stop reason: HOSPADM

## 2025-07-15 RX ORDER — DIAZEPAM 10 MG/2ML
2.5 INJECTION, SOLUTION INTRAMUSCULAR; INTRAVENOUS EVERY 10 MIN PRN
Status: DISCONTINUED | OUTPATIENT
Start: 2025-07-15 | End: 2025-07-15 | Stop reason: HOSPADM

## 2025-07-15 RX ORDER — METHIMAZOLE 5 MG/1
10 TABLET ORAL
Status: DISCONTINUED | OUTPATIENT
Start: 2025-07-16 | End: 2025-07-16 | Stop reason: HOSPADM

## 2025-07-15 RX ORDER — SODIUM CHLORIDE 9 MG/ML
INJECTION, SOLUTION INTRAVENOUS CONTINUOUS
Status: ACTIVE | OUTPATIENT
Start: 2025-07-15 | End: 2025-07-15

## 2025-07-15 RX ORDER — GLYCOPYRROLATE 0.2 MG/ML
INJECTION INTRAMUSCULAR; INTRAVENOUS
Status: DISCONTINUED | OUTPATIENT
Start: 2025-07-15 | End: 2025-07-15 | Stop reason: SDUPTHER

## 2025-07-15 RX ORDER — ASPIRIN 81 MG/1
81 TABLET ORAL DAILY
Status: DISCONTINUED | OUTPATIENT
Start: 2025-07-16 | End: 2025-07-16 | Stop reason: HOSPADM

## 2025-07-15 RX ORDER — ONDANSETRON 4 MG/1
4 TABLET, ORALLY DISINTEGRATING ORAL EVERY 8 HOURS PRN
Status: DISCONTINUED | OUTPATIENT
Start: 2025-07-15 | End: 2025-07-16 | Stop reason: HOSPADM

## 2025-07-15 RX ORDER — LIDOCAINE HYDROCHLORIDE 10 MG/ML
INJECTION, SOLUTION INFILTRATION; PERINEURAL PRN
Status: DISCONTINUED | OUTPATIENT
Start: 2025-07-15 | End: 2025-07-15 | Stop reason: HOSPADM

## 2025-07-15 RX ORDER — ALBUTEROL SULFATE 0.83 MG/ML
2.5 SOLUTION RESPIRATORY (INHALATION) EVERY 6 HOURS PRN
Status: DISCONTINUED | OUTPATIENT
Start: 2025-07-15 | End: 2025-07-16 | Stop reason: HOSPADM

## 2025-07-15 RX ORDER — ONDANSETRON 2 MG/ML
INJECTION INTRAMUSCULAR; INTRAVENOUS
Status: DISCONTINUED | OUTPATIENT
Start: 2025-07-15 | End: 2025-07-15 | Stop reason: SDUPTHER

## 2025-07-15 RX ORDER — DEXTROSE MONOHYDRATE 100 MG/ML
INJECTION, SOLUTION INTRAVENOUS CONTINUOUS PRN
Status: DISCONTINUED | OUTPATIENT
Start: 2025-07-15 | End: 2025-07-15 | Stop reason: HOSPADM

## 2025-07-15 RX ORDER — BUDESONIDE 0.25 MG/2ML
0.25 INHALANT ORAL
Status: DISCONTINUED | OUTPATIENT
Start: 2025-07-15 | End: 2025-07-16 | Stop reason: HOSPADM

## 2025-07-15 RX ORDER — FENTANYL CITRATE 50 UG/ML
INJECTION, SOLUTION INTRAMUSCULAR; INTRAVENOUS
Status: DISCONTINUED | OUTPATIENT
Start: 2025-07-15 | End: 2025-07-15 | Stop reason: SDUPTHER

## 2025-07-15 RX ORDER — DIAZEPAM 10 MG/2ML
2.5 INJECTION, SOLUTION INTRAMUSCULAR; INTRAVENOUS EVERY 10 MIN PRN
Status: DISCONTINUED | OUTPATIENT
Start: 2025-07-15 | End: 2025-07-16 | Stop reason: HOSPADM

## 2025-07-15 RX ORDER — METOCLOPRAMIDE HYDROCHLORIDE 5 MG/ML
10 INJECTION INTRAMUSCULAR; INTRAVENOUS
Status: DISCONTINUED | OUTPATIENT
Start: 2025-07-15 | End: 2025-07-15 | Stop reason: HOSPADM

## 2025-07-15 RX ORDER — ARFORMOTEROL TARTRATE 15 UG/2ML
15 SOLUTION RESPIRATORY (INHALATION)
Status: DISCONTINUED | OUTPATIENT
Start: 2025-07-15 | End: 2025-07-16 | Stop reason: HOSPADM

## 2025-07-15 RX ORDER — ACETAMINOPHEN 325 MG/1
650 TABLET ORAL
Status: DISCONTINUED | OUTPATIENT
Start: 2025-07-15 | End: 2025-07-15 | Stop reason: HOSPADM

## 2025-07-15 RX ORDER — PANTOPRAZOLE SODIUM 40 MG/1
40 TABLET, DELAYED RELEASE ORAL
Status: DISCONTINUED | OUTPATIENT
Start: 2025-07-16 | End: 2025-07-16 | Stop reason: HOSPADM

## 2025-07-15 RX ORDER — ACETAMINOPHEN 500 MG
1000 TABLET ORAL ONCE
Status: COMPLETED | OUTPATIENT
Start: 2025-07-15 | End: 2025-07-15

## 2025-07-15 RX ORDER — SODIUM CHLORIDE 0.9 % (FLUSH) 0.9 %
5-40 SYRINGE (ML) INJECTION PRN
Status: DISCONTINUED | OUTPATIENT
Start: 2025-07-15 | End: 2025-07-15 | Stop reason: HOSPADM

## 2025-07-15 RX ORDER — SODIUM CHLORIDE 9 MG/ML
INJECTION, SOLUTION INTRAVENOUS PRN
Status: DISCONTINUED | OUTPATIENT
Start: 2025-07-15 | End: 2025-07-16 | Stop reason: HOSPADM

## 2025-07-15 RX ORDER — NICARDIPINE HYDROCHLORIDE 0.1 MG/ML
INJECTION INTRAVENOUS
Status: DISCONTINUED | OUTPATIENT
Start: 2025-07-15 | End: 2025-07-15 | Stop reason: SDUPTHER

## 2025-07-15 RX ORDER — METOPROLOL SUCCINATE 25 MG/1
25 TABLET, EXTENDED RELEASE ORAL DAILY
Status: DISCONTINUED | OUTPATIENT
Start: 2025-07-16 | End: 2025-07-16 | Stop reason: HOSPADM

## 2025-07-15 RX ORDER — LIDOCAINE HYDROCHLORIDE 10 MG/ML
1 INJECTION, SOLUTION INFILTRATION; PERINEURAL
Status: DISCONTINUED | OUTPATIENT
Start: 2025-07-15 | End: 2025-07-15 | Stop reason: HOSPADM

## 2025-07-15 RX ORDER — IPRATROPIUM BROMIDE AND ALBUTEROL SULFATE 2.5; .5 MG/3ML; MG/3ML
1 SOLUTION RESPIRATORY (INHALATION)
Status: DISCONTINUED | OUTPATIENT
Start: 2025-07-15 | End: 2025-07-15 | Stop reason: HOSPADM

## 2025-07-15 RX ORDER — HEPARIN SODIUM 1000 [USP'U]/ML
INJECTION, SOLUTION INTRAVENOUS; SUBCUTANEOUS
Status: DISCONTINUED | OUTPATIENT
Start: 2025-07-15 | End: 2025-07-15 | Stop reason: SDUPTHER

## 2025-07-15 RX ORDER — ACETAMINOPHEN 325 MG/1
650 TABLET ORAL EVERY 4 HOURS PRN
Status: DISCONTINUED | OUTPATIENT
Start: 2025-07-15 | End: 2025-07-16 | Stop reason: HOSPADM

## 2025-07-15 RX ORDER — HYDRALAZINE HYDROCHLORIDE 20 MG/ML
10 INJECTION INTRAMUSCULAR; INTRAVENOUS
Status: DISCONTINUED | OUTPATIENT
Start: 2025-07-15 | End: 2025-07-15 | Stop reason: HOSPADM

## 2025-07-15 RX ORDER — DEXMEDETOMIDINE HYDROCHLORIDE 4 UG/ML
INJECTION, SOLUTION INTRAVENOUS
Status: DISCONTINUED | OUTPATIENT
Start: 2025-07-15 | End: 2025-07-15 | Stop reason: SDUPTHER

## 2025-07-15 RX ORDER — SODIUM CHLORIDE 9 MG/ML
INJECTION, SOLUTION INTRAVENOUS PRN
Status: DISCONTINUED | OUTPATIENT
Start: 2025-07-15 | End: 2025-07-15 | Stop reason: HOSPADM

## 2025-07-15 RX ORDER — HEPARIN SODIUM 200 [USP'U]/100ML
INJECTION, SOLUTION INTRAVENOUS CONTINUOUS PRN
Status: COMPLETED | OUTPATIENT
Start: 2025-07-15 | End: 2025-07-15

## 2025-07-15 RX ORDER — SODIUM CHLORIDE 0.9 % (FLUSH) 0.9 %
5-40 SYRINGE (ML) INJECTION PRN
Status: DISCONTINUED | OUTPATIENT
Start: 2025-07-15 | End: 2025-07-16 | Stop reason: HOSPADM

## 2025-07-15 RX ORDER — ACETAMINOPHEN 325 MG/1
650 TABLET ORAL EVERY 4 HOURS PRN
Status: DISCONTINUED | OUTPATIENT
Start: 2025-07-15 | End: 2025-07-16 | Stop reason: SDUPTHER

## 2025-07-15 RX ORDER — EZETIMIBE 10 MG/1
10 TABLET ORAL NIGHTLY
Status: DISCONTINUED | OUTPATIENT
Start: 2025-07-15 | End: 2025-07-16 | Stop reason: HOSPADM

## 2025-07-15 RX ORDER — ONDANSETRON 2 MG/ML
4 INJECTION INTRAMUSCULAR; INTRAVENOUS EVERY 6 HOURS PRN
Status: DISCONTINUED | OUTPATIENT
Start: 2025-07-15 | End: 2025-07-16 | Stop reason: HOSPADM

## 2025-07-15 RX ORDER — LOSARTAN POTASSIUM 50 MG/1
50 TABLET ORAL DAILY
Status: DISCONTINUED | OUTPATIENT
Start: 2025-07-16 | End: 2025-07-16 | Stop reason: HOSPADM

## 2025-07-15 RX ORDER — ROCURONIUM BROMIDE 10 MG/ML
INJECTION, SOLUTION INTRAVENOUS
Status: DISCONTINUED | OUTPATIENT
Start: 2025-07-15 | End: 2025-07-15 | Stop reason: SDUPTHER

## 2025-07-15 RX ORDER — SODIUM CHLORIDE, SODIUM LACTATE, POTASSIUM CHLORIDE, CALCIUM CHLORIDE 600; 310; 30; 20 MG/100ML; MG/100ML; MG/100ML; MG/100ML
INJECTION, SOLUTION INTRAVENOUS CONTINUOUS
Status: DISCONTINUED | OUTPATIENT
Start: 2025-07-15 | End: 2025-07-15 | Stop reason: HOSPADM

## 2025-07-15 RX ORDER — PROTAMINE SULFATE 10 MG/ML
INJECTION, SOLUTION INTRAVENOUS
Status: DISCONTINUED | OUTPATIENT
Start: 2025-07-15 | End: 2025-07-15 | Stop reason: SDUPTHER

## 2025-07-15 RX ORDER — LABETALOL HYDROCHLORIDE 5 MG/ML
10 INJECTION, SOLUTION INTRAVENOUS
Status: DISCONTINUED | OUTPATIENT
Start: 2025-07-15 | End: 2025-07-15 | Stop reason: HOSPADM

## 2025-07-15 RX ORDER — ACETAMINOPHEN 500 MG
1000 TABLET ORAL
Status: DISCONTINUED | OUTPATIENT
Start: 2025-07-15 | End: 2025-07-15 | Stop reason: HOSPADM

## 2025-07-15 RX ORDER — DEXAMETHASONE SODIUM PHOSPHATE 4 MG/ML
INJECTION, SOLUTION INTRA-ARTICULAR; INTRALESIONAL; INTRAMUSCULAR; INTRAVENOUS; SOFT TISSUE
Status: DISCONTINUED | OUTPATIENT
Start: 2025-07-15 | End: 2025-07-15 | Stop reason: SDUPTHER

## 2025-07-15 RX ORDER — DIPHENHYDRAMINE HYDROCHLORIDE 50 MG/ML
12.5 INJECTION, SOLUTION INTRAMUSCULAR; INTRAVENOUS
Status: DISCONTINUED | OUTPATIENT
Start: 2025-07-15 | End: 2025-07-15 | Stop reason: HOSPADM

## 2025-07-15 RX ORDER — ALLOPURINOL 100 MG/1
50 TABLET ORAL
Status: DISCONTINUED | OUTPATIENT
Start: 2025-07-16 | End: 2025-07-16 | Stop reason: HOSPADM

## 2025-07-15 RX ORDER — FUROSEMIDE 20 MG/1
20 TABLET ORAL DAILY
Status: DISCONTINUED | OUTPATIENT
Start: 2025-07-15 | End: 2025-07-16 | Stop reason: HOSPADM

## 2025-07-15 RX ORDER — CLOPIDOGREL BISULFATE 75 MG/1
75 TABLET ORAL DAILY
Status: DISCONTINUED | OUTPATIENT
Start: 2025-07-16 | End: 2025-07-15

## 2025-07-15 RX ORDER — DEXMEDETOMIDINE HYDROCHLORIDE 100 UG/ML
INJECTION, SOLUTION INTRAVENOUS
Status: DISCONTINUED | OUTPATIENT
Start: 2025-07-15 | End: 2025-07-15 | Stop reason: SDUPTHER

## 2025-07-15 RX ORDER — PROPOFOL 10 MG/ML
INJECTION, EMULSION INTRAVENOUS
Status: DISCONTINUED | OUTPATIENT
Start: 2025-07-15 | End: 2025-07-15 | Stop reason: SDUPTHER

## 2025-07-15 RX ORDER — ETOMIDATE 2 MG/ML
INJECTION INTRAVENOUS
Status: DISCONTINUED | OUTPATIENT
Start: 2025-07-15 | End: 2025-07-15 | Stop reason: SDUPTHER

## 2025-07-15 RX ORDER — LIDOCAINE HYDROCHLORIDE 10 MG/ML
INJECTION, SOLUTION INFILTRATION; PERINEURAL PRN
Status: DISCONTINUED | OUTPATIENT
Start: 2025-07-15 | End: 2025-07-15 | Stop reason: ALTCHOICE

## 2025-07-15 RX ORDER — IBUPROFEN 600 MG/1
1 TABLET ORAL PRN
Status: DISCONTINUED | OUTPATIENT
Start: 2025-07-15 | End: 2025-07-15 | Stop reason: HOSPADM

## 2025-07-15 RX ADMIN — SUGAMMADEX 200 MG: 100 INJECTION, SOLUTION INTRAVENOUS at 15:33

## 2025-07-15 RX ADMIN — METHIMAZOLE 5 MG: 5 TABLET ORAL at 20:29

## 2025-07-15 RX ADMIN — ROCURONIUM BROMIDE 25 MG: 10 INJECTION, SOLUTION INTRAVENOUS at 14:45

## 2025-07-15 RX ADMIN — NICARDIPINE HYDROCHLORIDE 0.25 MG: 0.1 INJECTION INTRAVENOUS at 14:12

## 2025-07-15 RX ADMIN — Medication 3 AMPULE: at 09:32

## 2025-07-15 RX ADMIN — SODIUM CHLORIDE, SODIUM LACTATE, POTASSIUM CHLORIDE, AND CALCIUM CHLORIDE: .6; .31; .03; .02 INJECTION, SOLUTION INTRAVENOUS at 10:32

## 2025-07-15 RX ADMIN — ROCURONIUM BROMIDE 50 MG: 10 INJECTION, SOLUTION INTRAVENOUS at 14:06

## 2025-07-15 RX ADMIN — HEPARIN SODIUM 10000 UNITS: 1000 INJECTION, SOLUTION INTRAVENOUS; SUBCUTANEOUS at 14:46

## 2025-07-15 RX ADMIN — PROTAMINE SULFATE 40 MG: 10 INJECTION, SOLUTION INTRAVENOUS at 15:20

## 2025-07-15 RX ADMIN — NICARDIPINE HYDROCHLORIDE 2.5 MG/HR: 0.1 INJECTION INTRAVENOUS at 14:13

## 2025-07-15 RX ADMIN — SODIUM CHLORIDE, PRESERVATIVE FREE 10 ML: 5 INJECTION INTRAVENOUS at 20:30

## 2025-07-15 RX ADMIN — ACETAMINOPHEN 1000 MG: 500 TABLET, FILM COATED ORAL at 09:20

## 2025-07-15 RX ADMIN — ARFORMOTEROL TARTRATE 15 MCG: 15 SOLUTION RESPIRATORY (INHALATION) at 19:27

## 2025-07-15 RX ADMIN — SODIUM CHLORIDE: 0.9 INJECTION, SOLUTION INTRAVENOUS at 09:20

## 2025-07-15 RX ADMIN — HEPARIN SODIUM 4000 UNITS: 1000 INJECTION, SOLUTION INTRAVENOUS; SUBCUTANEOUS at 14:57

## 2025-07-15 RX ADMIN — GLYCOPYRROLATE 0.2 MG: 0.2 INJECTION INTRAMUSCULAR; INTRAVENOUS at 14:25

## 2025-07-15 RX ADMIN — NICARDIPINE HYDROCHLORIDE 0.1 MG: 0.1 INJECTION INTRAVENOUS at 15:07

## 2025-07-15 RX ADMIN — FUROSEMIDE 20 MG: 20 TABLET ORAL at 20:29

## 2025-07-15 RX ADMIN — DEXAMETHASONE SODIUM PHOSPHATE 4 MG: 4 INJECTION INTRA-ARTICULAR; INTRALESIONAL; INTRAMUSCULAR; INTRAVENOUS; SOFT TISSUE at 14:41

## 2025-07-15 RX ADMIN — PROTAMINE SULFATE 30 MG: 10 INJECTION, SOLUTION INTRAVENOUS at 15:19

## 2025-07-15 RX ADMIN — PROPOFOL 50 MCG/KG/MIN: 10 INJECTION, EMULSION INTRAVENOUS at 13:49

## 2025-07-15 RX ADMIN — DIAZEPAM 2.5 MG: 10 INJECTION, SOLUTION INTRAMUSCULAR; INTRAVENOUS at 17:20

## 2025-07-15 RX ADMIN — NICARDIPINE HYDROCHLORIDE 0.2 MG: 0.1 INJECTION INTRAVENOUS at 15:21

## 2025-07-15 RX ADMIN — DEXMEDETOMIDINE 20 MCG: 100 INJECTION, SOLUTION, CONCENTRATE INTRAVENOUS at 13:47

## 2025-07-15 RX ADMIN — DEXMEDETOMIDINE HYDROCHLORIDE 1 MCG/KG/HR: 4 INJECTION, SOLUTION INTRAVENOUS at 13:48

## 2025-07-15 RX ADMIN — BUDESONIDE 250 MCG: 0.25 SUSPENSION RESPIRATORY (INHALATION) at 19:27

## 2025-07-15 RX ADMIN — ETOMIDATE 20 MG: 2 INJECTION, SOLUTION INTRAVENOUS at 14:06

## 2025-07-15 RX ADMIN — NICARDIPINE HYDROCHLORIDE 0.2 MG: 0.1 INJECTION INTRAVENOUS at 15:24

## 2025-07-15 RX ADMIN — EZETIMIBE 10 MG: 10 TABLET ORAL at 20:29

## 2025-07-15 RX ADMIN — FENTANYL CITRATE 50 MCG: 50 INJECTION, SOLUTION INTRAMUSCULAR; INTRAVENOUS at 15:39

## 2025-07-15 RX ADMIN — Medication 2000 MG: at 14:19

## 2025-07-15 RX ADMIN — CEFAZOLIN 2000 MG: 10 INJECTION, POWDER, FOR SOLUTION INTRAVENOUS at 21:33

## 2025-07-15 RX ADMIN — DRONEDARONE 400 MG: 400 TABLET, FILM COATED ORAL at 20:29

## 2025-07-15 RX ADMIN — IPRATROPIUM BROMIDE 0.5 MG: 0.5 SOLUTION RESPIRATORY (INHALATION) at 19:27

## 2025-07-15 RX ADMIN — NICARDIPINE HYDROCHLORIDE 0.3 MG: 0.1 INJECTION INTRAVENOUS at 15:33

## 2025-07-15 RX ADMIN — DEXMEDETOMIDINE 20 MCG: 100 INJECTION, SOLUTION, CONCENTRATE INTRAVENOUS at 13:52

## 2025-07-15 RX ADMIN — DEXMEDETOMIDINE 20 MCG: 100 INJECTION, SOLUTION, CONCENTRATE INTRAVENOUS at 13:44

## 2025-07-15 RX ADMIN — FENTANYL CITRATE 50 MCG: 50 INJECTION, SOLUTION INTRAMUSCULAR; INTRAVENOUS at 15:43

## 2025-07-15 RX ADMIN — ONDANSETRON 4 MG: 2 INJECTION, SOLUTION INTRAMUSCULAR; INTRAVENOUS at 14:41

## 2025-07-15 RX ADMIN — NICARDIPINE HYDROCHLORIDE 0.2 MG: 0.1 INJECTION INTRAVENOUS at 15:19

## 2025-07-15 ASSESSMENT — ENCOUNTER SYMPTOMS
BACK PAIN: 0
ALLERGIC/IMMUNOLOGIC NEGATIVE: 1
ABDOMINAL PAIN: 0
CHEST TIGHTNESS: 0
GASTROINTESTINAL NEGATIVE: 1
EYES NEGATIVE: 1
PHOTOPHOBIA: 0
SHORTNESS OF BREATH: 1
EYE PAIN: 0

## 2025-07-15 ASSESSMENT — PAIN - FUNCTIONAL ASSESSMENT: PAIN_FUNCTIONAL_ASSESSMENT: 0-10

## 2025-07-15 NOTE — PLAN OF CARE
Problem: Pain  Goal: Verbalizes/displays adequate comfort level or baseline comfort level  Outcome: Progressing     Problem: Safety - Adult  Goal: Free from fall injury  Outcome: Progressing  Flowsheets (Taken 7/15/2025 1822)  Free From Fall Injury: Instruct family/caregiver on patient safety     Problem: Chronic Conditions and Co-morbidities  Goal: Patient's chronic conditions and co-morbidity symptoms are monitored and maintained or improved  Outcome: Progressing  Flowsheets (Taken 7/15/2025 1800)  Care Plan - Patient's Chronic Conditions and Co-Morbidity Symptoms are Monitored and Maintained or Improved:   Update acute care plan with appropriate goals if chronic or comorbid symptoms are exacerbated and prevent overall improvement and discharge   Monitor and assess patient's chronic conditions and comorbid symptoms for stability, deterioration, or improvement   Collaborate with multidisciplinary team to address chronic and comorbid conditions and prevent exacerbation or deterioration     Problem: Discharge Planning  Goal: Discharge to home or other facility with appropriate resources  Outcome: Progressing  Flowsheets (Taken 7/15/2025 1800)  Discharge to home or other facility with appropriate resources:   Arrange for needed discharge resources and transportation as appropriate   Identify barriers to discharge with patient and caregiver   Identify discharge learning needs (meds, wound care, etc)   Refer to discharge planning if patient needs post-hospital services based on physician order or complex needs related to functional status, cognitive ability or social support system     Problem: Skin/Tissue Integrity  Goal: Skin integrity remains intact  Description: 1.  Monitor for areas of redness and/or skin breakdown  2.  Assess vascular access sites hourly  3.  Every 4-6 hours minimum:  Change oxygen saturation probe site  4.  Every 4-6 hours:  If on nasal continuous positive airway pressure, respiratory therapy

## 2025-07-15 NOTE — ANESTHESIA PROCEDURE NOTES
Arterial Line:    An arterial line was placed using ultrasound guidance, in the OR for the following indication(s): continuous blood pressure monitoring and blood sampling needed.    A  (size), 1 and 1/4 inch (1.5inch) (length), Arrow (type) catheter was placed, Seldinger technique used, into the left radial artery, secured by tape and Tegaderm.  Anesthesia type: Local  Local infiltration: Injection    Events:  patient tolerated procedure well with no complications and EBL < 5mL.    Additional notes:  Left arm prepped with ChloraPrep, 0.8ml of 1% lidocaine infiltrated at skin, ultrasound guided Seldinger technique, good blood return, good waveform.      Potential access sites were examined with ultrasound and acceptable patent access site selected as noted above.  Needle path and artery access visualized in real time using ultrasound, an image of wire in vessel recorded for permanent record.   7/15/2025 1:50 PM7/15/2025 1:55 PM  Resident/CRNA: Shawn German Jr., APRN - CRNA  Performed: Resident/CRNA   Preanesthetic Checklist  Completed: patient identified, IV checked, site marked, risks and benefits discussed, surgical/procedural consents, equipment checked, pre-op evaluation, timeout performed, anesthesia consent given, oxygen available, monitors applied/VS acknowledged, fire risk safety assessment completed and verbalized and blood product R/B/A discussed and consented

## 2025-07-15 NOTE — PERIOP NOTE
TRANSFER - OUT REPORT:    Verbal report given to Delores RN on Precious Burrell  being transferred to [facility] for routine post-op       Report consisted of patient's Situation, Background, Assessment and   Recommendations(SBAR).     Information from the following report(s) Nurse Handoff Report was reviewed with the receiving nurse.           Lines:   Peripheral IV 07/15/25 Left;Posterior Hand (Active)   Site Assessment Clean, dry & intact 07/15/25 1550   Line Status Brisk blood return;Flushed;Infusing 07/15/25 1550   Line Care Connections checked and tightened 07/15/25 1550   Phlebitis Assessment No symptoms 07/15/25 1550   Infiltration Assessment 0 07/15/25 1550   Alcohol Cap Used No 07/15/25 1550   Dressing Status Clean, dry & intact 07/15/25 1550   Dressing Type Transparent 07/15/25 1550   Dressing Intervention New 07/15/25 1027       Peripheral IV 07/15/25 Posterior;Right Hand (Active)   Site Assessment Clean, dry & intact 07/15/25 1550   Line Status Brisk blood return;Flushed;Infusing 07/15/25 1550   Line Care Connections checked and tightened 07/15/25 1550   Phlebitis Assessment No symptoms 07/15/25 1550   Infiltration Assessment 0 07/15/25 1550   Alcohol Cap Used No 07/15/25 1550   Dressing Status Clean, dry & intact 07/15/25 1550   Dressing Type Transparent 07/15/25 1550   Dressing Intervention New 07/15/25 1027       Arterial Line 07/15/25 Radial (Active)   Site Assessment Clean, dry & intact 07/15/25 1630   Line Status Arterial fluids per protocol;Intact and in place 07/15/25 1550   Art Line Interventions Zeroed and calibrated;Leveled;Connections checked and tightened 07/15/25 1550   Color/Movement/Sensation Capillary refill less than 3 sec 07/15/25 1550   Dressing Type Transparent 07/15/25 1550   Dressing Status Clean, dry & intact 07/15/25 1550        Opportunity for questions and clarification was provided.      Patient transported with:  Monitor, O2 @ 2lpm, and Registered Nurse

## 2025-07-15 NOTE — ANESTHESIA PROCEDURE NOTES
Airway  Date/Time: 7/15/2025 2:10 PM  Urgency: elective    Airway not difficult    General Information and Staff    Patient location during procedure: OR  Performed: resident/CRNA   Performed by: Shawn German Jr., APRN - CRNA  Authorized by: Paramjit Sanabria MD      Indications and Patient Condition  Indications for airway management: anesthesia  Spontaneous Ventilation: absent  Sedation level: deep  Preoxygenated: yes  Patient position: sniffing  MILS not maintained throughout  Mask difficulty assessment: vent by bag mask + OA or adjuvant +/- NMBA    Final Airway Details  Final airway type: endotracheal airway      Successful airway: ETT  Cuffed: yes   Successful intubation technique: direct laryngoscopy  Facilitating devices/methods: intubating stylet  Endotracheal tube insertion site: oral  Blade: Shyam  Blade size: #3  ETT size (mm): 7.0  Cormack-Lehane Classification: grade I - full view of glottis  Placement verified by: chest auscultation and capnometry   Measured from: lips  ETT to lips (cm): 23  Number of attempts at approach: 1  Ventilation between attempts: bag mask  Number of other approaches attempted: 0    Additional Comments  Lips and dentition remain the same  no

## 2025-07-15 NOTE — ANESTHESIA PRE PROCEDURE
Neuro/Psych:   (+) psychiatric history:            GI/Hepatic/Renal:   (+) GERD: well controlled          Endo/Other:    (+) Diabetes.                 Abdominal:   (+) obese          Vascular:          Other Findings:       Anesthesia Plan      TIVA     ASA 4     (Discussed use of invasive monitoring and additional PIV as necessary, conversion to GETA as necessary)  Induction: intravenous.    MIPS: Prophylactic antiemetics administered.  Anesthetic plan and risks discussed with patient.                    Paramjit Sanabria MD   7/15/2025

## 2025-07-15 NOTE — ANESTHESIA POSTPROCEDURE EVALUATION
Department of Anesthesiology  Postprocedure Note    Patient: Precious Burrell  MRN: [id number]  YOB: 1945  Date of evaluation: 7/15/2025    Procedure Summary       Date: 07/15/25 Room / Location: [facility] MAIN OR [address] [place] [address] MAIN OR    Anesthesia Start: 1341 Anesthesia Stop: 1554    Procedure: Transcatheter aortic valve replacement, IV hydration (Chest) Diagnosis:       Aortic valve stenosis, etiology of cardiac valve disease unspecified      (severe AS s/p TAVR)    Providers: Luc Egan MD Responsible Provider: Paramjit Sanabria MD    Anesthesia Type: TIVA ASA Status: 4            Anesthesia Type: No value filed.    Teresita Phase I: Teresita Score: 9    Teresita Phase II:      Anesthesia Post Evaluation    Patient location during evaluation: ICU  Patient participation: waiting for patient participation  Level of consciousness: sleepy but conscious  Pain scale: Controlled.  Airway patency: patent  Nausea & Vomiting: no nausea and no vomiting  Cardiovascular status: blood pressure returned to baseline  Respiratory status: acceptable  Hydration status: euvolemic  Comments: Transfer to CVICU; patient sedated but spont ventilating  Pain management: adequate    There were no known notable events for this encounter.

## 2025-07-15 NOTE — H&P
[facility] Cardiology Consult    Consult Cardiologist: Luc Egan MD     Primary Cardiologist: Isaac Downey MD    Primary Care Physician: Cristiana Gutierrez DO     Subjective:     Precious Burrell is a 79 y.o.   presents with severe symptomatic aortic stenosis. She reports NYHA class III dyspnea symptoms. Has had the work up listed below.  Planning on TAVR via RFA today.    Cardiac Hx (Reviewed and summarized by me):  TAVR consult from Dr Downey  1) Severe AS              Echo 4/16/25 - LVEF 50-55% mean AV gradient 31 mmHg, DI 0.22 low flow low gradient severe.  2) CAD              11/12/19 PCI to pLAD iFR guided (Nessmith) - failed radial access.   6/19/25 - Cath m/m nonobstructive CAD patent LAD stent (Green)  3) pAFib - on eliquis hx of anemia and GIB in 2022  4) CTA/CT chest 5/12/25 - 26 mm Jeanmarie recommended. Calcium AV score 1053  5) lipid panel 1/17/25 - HDL 37, LDL 70, Trig 101, Ratio 3.4  6) CKD with Cr 1.73 and GFR of 30.    ECG: Sinus bradycardia with LBBB and leftward axis (Independent review/interpretation by me)      Past Medical History:   Diagnosis Date    Abnormal stress test 2006    Adverse effect of anesthesia     after hysterectomy \"swelling, fluid\"    Arthritis     AS (aortic stenosis)     Asthma 11/12/2015    Atrial septal aneurysm     CAD (coronary artery disease)     stent    Chest pain 11/12/2015    Chronic kidney disease     CKD 4    Chronic venous insufficiency     COPD (chronic obstructive pulmonary disease) ([facility]) 11/12/2015    Depression     hx of    Diabetes ([facility])     no medication, does not check BS,  no recent problems with hypoglycemia    Essential hypertension 11/12/2015    GERD (gastroesophageal reflux disease)     History of blood transfusion 1981    History of left heart catheterization (LHC) 1990s     per pt - normal    Hx of echocardiogram 04/16/2025    estimated EF of 50 - 55%. Left ventricle size is normal. Mildly increased wall thickness. Diastolic dysfunction

## 2025-07-15 NOTE — OP NOTE
sheaths were placed. A temporary transvenous right ventricular pacing wire was positioned via the femoral vein. Next, a micropuncture kit was used to gain access to the right common femoral artery and a 6-Colombian sheath was placed. This was upsized and the 14-Colombian Resilia transcatheter heart valve sheath was placed after deploying Perclose devices in a pre-close fashion.      Next, a balloon valvuloplasty was performed. The patient remained hemodynamically stable. The 26mm Jeanmarie 3 valve was then prepped and mounted onto the delivery system in the correct orientation. This was then positioned in the ascending aorta, and then advanced across the aortic valve and deployed successfully. The details of this are dictated under a separate cover in Dr. Egan's report. Following deployment, an aortogram and transesophageal echocardiogram were performed, which demonstrated a trace aortic regurgitation and a well-seated, well-functioning prosthetic valve in the aortic position. The patient remained hemodynamically stable and in normal sinus rhythm. At this point, the delivery system and the sheath were removed from the right common femoral artery, and hemostasis was obtained using the Perclose sutures. Sheaths were also removed from the left common femoral artery and vein. At the conclusion of the procedure, the patient was hemodynamically stable and hemostasis was good. The patient was then transported to recovery unit in stable condition. At the end of the case, all sharp, sponge, and instrument counts were reported as being correct.    Estimated Blood Loss:  50cc    Tourniquet Time: * No tourniquets in log *      Implants:   Implant Name Type Inv. Item Serial No.  Lot No. LRB No. Used Action   VALVE AORT JEANMARIE 3 ULTRA RESILIA 26MM - [id number] Aortic valves VALVE AORT JEANMARIE 3 ULTRA RESILIA 26MM [id number] [employer]-  N/A 1 Implanted   DEVICE CLSR ANGIO-SEAL VIP 6FR 0.035IN V TWST INTEGR  PLATFR - [id number]  DEVICE CLSR ANGIO-SEAL VIP 6FR 0.035IN V TWST INTEGR PLATFRM  [employer] [id number] N/A 1 Implanted               Specimens: * No specimens in log *        Drains: None                Complications: None    Counts: Sponge and needle counts were correct times two.    Signed By:  Chemo Garcia MD     July 15, 2025

## 2025-07-16 ENCOUNTER — APPOINTMENT (OUTPATIENT)
Dept: NON INVASIVE DIAGNOSTICS | Age: 80
DRG: 267 | End: 2025-07-16
Attending: INTERNAL MEDICINE
Payer: MEDICARE

## 2025-07-16 VITALS
TEMPERATURE: 98.2 F | HEIGHT: 62 IN | RESPIRATION RATE: 18 BRPM | DIASTOLIC BLOOD PRESSURE: 55 MMHG | OXYGEN SATURATION: 97 % | HEART RATE: 64 BPM | BODY MASS INDEX: 38.38 KG/M2 | WEIGHT: 208.56 LBS | SYSTOLIC BLOOD PRESSURE: 129 MMHG

## 2025-07-16 DIAGNOSIS — Z95.2 S/P TAVR (TRANSCATHETER AORTIC VALVE REPLACEMENT): Primary | ICD-10-CM

## 2025-07-16 LAB
ANION GAP SERPL CALC-SCNC: 12 MMOL/L (ref 7–16)
BASOPHILS # BLD: 0.03 K/UL (ref 0–0.2)
BASOPHILS NFR BLD: 0.3 % (ref 0–2)
BUN SERPL-MCNC: 23 MG/DL (ref 8–23)
CALCIUM SERPL-MCNC: 8.7 MG/DL (ref 8.8–10.2)
CHLORIDE SERPL-SCNC: 105 MMOL/L (ref 98–107)
CO2 SERPL-SCNC: 20 MMOL/L (ref 20–29)
CREAT SERPL-MCNC: 1.58 MG/DL (ref 0.6–1.1)
DIFFERENTIAL METHOD BLD: ABNORMAL
ECHO AO ASC DIAM: 3.7 CM
ECHO AO ASCENDING AORTA INDEX: 1.9 CM/M2
ECHO AV ACCELERATION TIME: 91 MS
ECHO AV AREA PEAK VELOCITY: 1.6 CM2
ECHO AV AREA VTI: 1.7 CM2
ECHO AV AREA/BSA PEAK VELOCITY: 0.8 CM2/M2
ECHO AV AREA/BSA VTI: 0.9 CM2/M2
ECHO AV MEAN GRADIENT: 7 MMHG
ECHO AV MEAN VELOCITY: 1.2 M/S
ECHO AV PEAK GRADIENT: 14 MMHG
ECHO AV PEAK VELOCITY: 1.9 M/S
ECHO AV VELOCITY RATIO: 0.42
ECHO AV VTI: 42 CM
ECHO BSA: 2.07 M2
ECHO EST RA PRESSURE: 8 MMHG
ECHO IVC PROX: 1.9 CM
ECHO LA AREA 2C: 27.9 CM2
ECHO LA AREA 4C: 30.1 CM2
ECHO LA DIAMETER INDEX: 2.36 CM/M2
ECHO LA DIAMETER: 4.6 CM
ECHO LA MAJOR AXIS: 6.7 CM
ECHO LA MINOR AXIS: 7.1 CM
ECHO LA VOL BP: 103 ML (ref 22–52)
ECHO LA VOL MOD A2C: 90 ML (ref 22–52)
ECHO LA VOL MOD A4C: 112 ML (ref 22–52)
ECHO LA VOL/BSA BIPLANE: 53 ML/M2 (ref 16–34)
ECHO LA VOLUME INDEX MOD A2C: 46 ML/M2 (ref 16–34)
ECHO LA VOLUME INDEX MOD A4C: 57 ML/M2 (ref 16–34)
ECHO LV E' LATERAL VELOCITY: 5 CM/S
ECHO LV E' SEPTAL VELOCITY: 4.68 CM/S
ECHO LV EDV A2C: 151 ML
ECHO LV EDV A4C: 171 ML
ECHO LV EDV INDEX A4C: 88 ML/M2
ECHO LV EDV NDEX A2C: 77 ML/M2
ECHO LV EF PHYSICIAN: 55 %
ECHO LV EJECTION FRACTION A2C: 53 %
ECHO LV EJECTION FRACTION A4C: 53 %
ECHO LV EJECTION FRACTION BIPLANE: 54 % (ref 55–100)
ECHO LV ESV A2C: 71 ML
ECHO LV ESV A4C: 80 ML
ECHO LV ESV INDEX A2C: 36 ML/M2
ECHO LV ESV INDEX A4C: 41 ML/M2
ECHO LV FRACTIONAL SHORTENING: 18 % (ref 28–44)
ECHO LV INTERNAL DIMENSION DIASTOLE INDEX: 3.08 CM/M2
ECHO LV INTERNAL DIMENSION DIASTOLIC: 6 CM (ref 3.9–5.3)
ECHO LV INTERNAL DIMENSION SYSTOLIC INDEX: 2.51 CM/M2
ECHO LV INTERNAL DIMENSION SYSTOLIC: 4.9 CM
ECHO LV IVSD: 1.1 CM (ref 0.6–0.9)
ECHO LV MASS 2D: 263 G (ref 67–162)
ECHO LV MASS INDEX 2D: 134.9 G/M2 (ref 43–95)
ECHO LV POSTERIOR WALL DIASTOLIC: 1 CM (ref 0.6–0.9)
ECHO LV RELATIVE WALL THICKNESS RATIO: 0.33
ECHO LVOT AREA: 3.8 CM2
ECHO LVOT AV VTI INDEX: 0.43
ECHO LVOT DIAM: 2.2 CM
ECHO LVOT MEAN GRADIENT: 1 MMHG
ECHO LVOT PEAK GRADIENT: 2 MMHG
ECHO LVOT PEAK VELOCITY: 0.8 M/S
ECHO LVOT STROKE VOLUME INDEX: 35.5 ML/M2
ECHO LVOT SV: 69.1 ML
ECHO LVOT VTI: 18.2 CM
ECHO MV A VELOCITY: 1.46 M/S
ECHO MV AREA VTI: 1.8 CM2
ECHO MV E DECELERATION TIME (DT): 199 MS
ECHO MV E VELOCITY: 0.96 M/S
ECHO MV E/A RATIO: 0.66
ECHO MV E/E' LATERAL: 19.2
ECHO MV E/E' RATIO (AVERAGED): 19.86
ECHO MV E/E' SEPTAL: 20.51
ECHO MV LVOT VTI INDEX: 2.1
ECHO MV MAX VELOCITY: 1.4 M/S
ECHO MV MEAN GRADIENT: 4 MMHG
ECHO MV MEAN VELOCITY: 0.9 M/S
ECHO MV PEAK GRADIENT: 8 MMHG
ECHO MV VTI: 38.2 CM
ECHO PV ACCELERATION TIME (AT): 95 MS
ECHO PV MAX VELOCITY: 1 M/S
ECHO PV PEAK GRADIENT: 4 MMHG
ECHO RV BASAL DIMENSION: 3.9 CM
ECHO RV FREE WALL PEAK S': 13.5 CM/S
ECHO RV TAPSE: 2.8 CM (ref 1.7–?)
EOSINOPHIL # BLD: 0.01 K/UL (ref 0–0.8)
EOSINOPHIL NFR BLD: 0.1 % (ref 0.5–7.8)
ERYTHROCYTE [DISTWIDTH] IN BLOOD BY AUTOMATED COUNT: 15.9 % (ref 11.9–14.6)
GLUCOSE SERPL-MCNC: 130 MG/DL (ref 70–99)
HCT VFR BLD AUTO: 29.4 % (ref 35.8–46.3)
HGB BLD-MCNC: 9.3 G/DL (ref 11.7–15.4)
IMM GRANULOCYTES # BLD AUTO: 0.05 K/UL (ref 0–0.5)
IMM GRANULOCYTES NFR BLD AUTO: 0.4 % (ref 0–5)
LYMPHOCYTES # BLD: 1.49 K/UL (ref 0.5–4.6)
LYMPHOCYTES NFR BLD: 13.3 % (ref 13–44)
MAGNESIUM SERPL-MCNC: 1.6 MG/DL (ref 1.8–2.4)
MCH RBC QN AUTO: 29.5 PG (ref 26.1–32.9)
MCHC RBC AUTO-ENTMCNC: 31.6 G/DL (ref 31.4–35)
MCV RBC AUTO: 93.3 FL (ref 82–102)
MONOCYTES # BLD: 0.65 K/UL (ref 0.1–1.3)
MONOCYTES NFR BLD: 5.8 % (ref 4–12)
NEUTS SEG # BLD: 8.95 K/UL (ref 1.7–8.2)
NEUTS SEG NFR BLD: 80.1 % (ref 43–78)
NRBC # BLD: 0 K/UL (ref 0–0.2)
PLATELET # BLD AUTO: 204 K/UL (ref 150–450)
PMV BLD AUTO: 10.2 FL (ref 9.4–12.3)
POTASSIUM SERPL-SCNC: 4.4 MMOL/L (ref 3.5–5.1)
RBC # BLD AUTO: 3.15 M/UL (ref 4.05–5.2)
SODIUM SERPL-SCNC: 137 MMOL/L (ref 136–145)
WBC # BLD AUTO: 11.2 K/UL (ref 4.3–11.1)

## 2025-07-16 PROCEDURE — 94664 DEMO&/EVAL PT USE INHALER: CPT

## 2025-07-16 PROCEDURE — 6360000004 HC RX CONTRAST MEDICATION: Performed by: INTERNAL MEDICINE

## 2025-07-16 PROCEDURE — 6370000000 HC RX 637 (ALT 250 FOR IP)

## 2025-07-16 PROCEDURE — 83735 ASSAY OF MAGNESIUM: CPT

## 2025-07-16 PROCEDURE — 94760 N-INVAS EAR/PLS OXIMETRY 1: CPT

## 2025-07-16 PROCEDURE — 99238 HOSP IP/OBS DSCHRG MGMT 30/<: CPT | Performed by: INTERNAL MEDICINE

## 2025-07-16 PROCEDURE — 80048 BASIC METABOLIC PNL TOTAL CA: CPT

## 2025-07-16 PROCEDURE — 6370000000 HC RX 637 (ALT 250 FOR IP): Performed by: INTERNAL MEDICINE

## 2025-07-16 PROCEDURE — 2700000000 HC OXYGEN THERAPY PER DAY

## 2025-07-16 PROCEDURE — 2500000003 HC RX 250 WO HCPCS: Performed by: INTERNAL MEDICINE

## 2025-07-16 PROCEDURE — 93306 TTE W/DOPPLER COMPLETE: CPT | Performed by: INTERNAL MEDICINE

## 2025-07-16 PROCEDURE — C8929 TTE W OR WO FOL WCON,DOPPLER: HCPCS

## 2025-07-16 PROCEDURE — 94761 N-INVAS EAR/PLS OXIMETRY MLT: CPT

## 2025-07-16 PROCEDURE — 85025 COMPLETE CBC W/AUTO DIFF WBC: CPT

## 2025-07-16 PROCEDURE — 94640 AIRWAY INHALATION TREATMENT: CPT

## 2025-07-16 PROCEDURE — 6360000002 HC RX W HCPCS: Performed by: INTERNAL MEDICINE

## 2025-07-16 PROCEDURE — 36415 COLL VENOUS BLD VENIPUNCTURE: CPT

## 2025-07-16 RX ORDER — LANOLIN ALCOHOL/MO/W.PET/CERES
400 CREAM (GRAM) TOPICAL DAILY
Status: DISCONTINUED | OUTPATIENT
Start: 2025-07-16 | End: 2025-07-16 | Stop reason: HOSPADM

## 2025-07-16 RX ADMIN — ALLOPURINOL 50 MG: 100 TABLET ORAL at 08:17

## 2025-07-16 RX ADMIN — PANTOPRAZOLE SODIUM 40 MG: 40 TABLET, DELAYED RELEASE ORAL at 05:32

## 2025-07-16 RX ADMIN — ASPIRIN 81 MG: 81 TABLET ORAL at 08:16

## 2025-07-16 RX ADMIN — CEFAZOLIN 2000 MG: 10 INJECTION, POWDER, FOR SOLUTION INTRAVENOUS at 05:32

## 2025-07-16 RX ADMIN — SODIUM CHLORIDE, PRESERVATIVE FREE 10 ML: 5 INJECTION INTRAVENOUS at 08:20

## 2025-07-16 RX ADMIN — MONTELUKAST 10 MG: 10 TABLET, FILM COATED ORAL at 08:17

## 2025-07-16 RX ADMIN — LOSARTAN POTASSIUM 50 MG: 50 TABLET, FILM COATED ORAL at 08:16

## 2025-07-16 RX ADMIN — IPRATROPIUM BROMIDE 0.5 MG: 0.5 SOLUTION RESPIRATORY (INHALATION) at 14:26

## 2025-07-16 RX ADMIN — IPRATROPIUM BROMIDE 0.5 MG: 0.5 SOLUTION RESPIRATORY (INHALATION) at 07:38

## 2025-07-16 RX ADMIN — ARFORMOTEROL TARTRATE 15 MCG: 15 SOLUTION RESPIRATORY (INHALATION) at 07:38

## 2025-07-16 RX ADMIN — METOPROLOL SUCCINATE 25 MG: 25 TABLET, EXTENDED RELEASE ORAL at 08:17

## 2025-07-16 RX ADMIN — SULFUR HEXAFLUORIDE 3 ML: KIT at 08:55

## 2025-07-16 RX ADMIN — FUROSEMIDE 20 MG: 20 TABLET ORAL at 08:16

## 2025-07-16 RX ADMIN — APIXABAN 5 MG: 5 TABLET, FILM COATED ORAL at 08:16

## 2025-07-16 RX ADMIN — BUDESONIDE 250 MCG: 0.25 SUSPENSION RESPIRATORY (INHALATION) at 07:38

## 2025-07-16 RX ADMIN — DRONEDARONE 400 MG: 400 TABLET, FILM COATED ORAL at 08:16

## 2025-07-16 RX ADMIN — MAGNESIUM GLUCONATE 500 MG ORAL TABLET 400 MG: 500 TABLET ORAL at 14:00

## 2025-07-16 ASSESSMENT — PAIN SCALES - GENERAL
PAINLEVEL_OUTOF10: 0
PAINLEVEL_OUTOF10: 0

## 2025-07-16 NOTE — DISCHARGE SUMMARY
- 99 mg/dL    BUN 23 8 - 23 MG/DL    Creatinine 1.58 (H) 0.60 - 1.10 MG/DL    Est, Glom Filt Rate 34 (L) >60 ml/min/1.73m2    Calcium 8.7 (L) 8.8 - 10.2 MG/DL   Magnesium    Collection Time: 07/16/25  8:06 AM   Result Value Ref Range    Magnesium 1.6 (L) 1.8 - 2.4 mg/dL   Transthoracic echocardiogram (TTE) complete with contrast, bubble, strain, and 3D PRN    Collection Time: 07/16/25  9:00 AM   Result Value Ref Range    LV EDV A2C 151 mL    LV EDV A4C 171 mL    LV ESV A2C 71 mL    LV ESV A4C 80 mL    IVSd 1.1 (A) 0.6 - 0.9 cm    LVIDd 6.0 (A) 3.9 - 5.3 cm    LVIDs 4.9 cm    LVOT Diameter 2.2 cm    LVOT Mean Gradient 1 mmHg    LVOT VTI 18.2 cm    LVOT Peak Velocity 0.8 m/s    LVOT Peak Gradient 2 mmHg    LVPWd 1.0 (A) 0.6 - 0.9 cm    LV E' Lateral Velocity 5.00 cm/s    LV E' Septal Velocity 4.68 cm/s    LV Ejection Fraction A2C 53 %    LV Ejection Fraction A4C 53 %    EF BP 54 (A) 55 - 100 %    LVOT Area 3.8 cm2    LVOT SV 69.1 ml    LA Minor Axis 7.1 cm    LA Major Baskin 6.7 cm    LA Area 2C 27.9 cm2    LA Area 4C 30.1 cm2    LA Volume MOD A2C 90 (A) 22 - 52 mL    LA Volume MOD A4C 112 (A) 22 - 52 mL    LA Volume  (A) 22 - 52 mL    LA Diameter 4.6 cm    AV AT 91.00 ms    AV Mean Velocity 1.2 m/s    AV Mean Gradient 7 mmHg    AV VTI 42.0 cm    AV Peak Velocity 1.9 m/s    AV Peak Gradient 14 mmHg    AV Area by VTI 1.7 cm2    AV Area by Peak Velocity 1.6 cm2    Ascending Aorta 3.7 cm    IVC Proxmal 1.9 cm    MV E Wave Deceleration Time 199.0 ms    MV A Velocity 1.46 m/s    MV E Velocity 0.96 m/s    MV Mean Velocity 0.9 m/s    MV Mean Gradient 4 mmHg    MV VTI 38.2 cm    MV Max Velocity 1.4 m/s    MV Peak Gradient 8 mmHg    MV Area by VTI 1.8 cm2    PV AT 95.0 ms    PV Max Velocity 1.0 m/s    PV Peak Gradient 4 mmHg    Est. RA Pressure 8 mmHg    RV Basal Dimension 3.9 cm    RV Free Wall Peak S' 13.5 cm/s    TAPSE 2.8 >=1.7 cm    Body Surface Area 2.07 m2    Fractional Shortening 2D 18 28 - 44 %    LV ESV Index A4C  41 mL/m2    LV EDV Index A4C 88 mL/m2    LV ESV Index A2C 36 mL/m2    LV EDV Index A2C 77 mL/m2    LVIDd Index 3.08 cm/m2    LVIDs Index 2.51 cm/m2    LV RWT Ratio 0.33     LV Mass 2D 263.0 (A) 67 - 162 g    LV Mass 2D Index 134.9 (A) 43 - 95 g/m2    MV E/A 0.66     E/E' Ratio (Averaged) 19.86     E/E' Lateral 19.20     E/E' Septal 20.51     LA Volume Index BP 53 (A) 16 - 34 ml/m2    LVOT Stroke Volume Index 35.5 mL/m2    LA Volume Index MOD A2C 46 (A) 16 - 34 ml/m2    LA Volume Index MOD A4C 57 (A) 16 - 34 ml/m2    LA Size Index 2.36 cm/m2    Ascending Aorta Index 1.90 cm/m2    AV Velocity Ratio 0.42     LVOT:AV VTI Index 0.43     COMPA/BSA VTI 0.9 cm2/m2    COMPA/BSA Peak Velocity 0.8 cm2/m2    MV:LVOT VTI Index 2.10     EF Physician 55 %         Patient Instructions:      Medication List        CHANGE how you take these medications      ezetimibe 10 MG tablet  Commonly known as: ZETIA  Take 1 tablet by mouth daily  What changed: when to take this            CONTINUE taking these medications      albuterol sulfate  (90 Base) MCG/ACT inhaler  Commonly known as: PROVENTIL;VENTOLIN;PROAIR     allopurinol 100 MG tablet  Commonly known as: ZYLOPRIM     apixaban 5 MG Tabs tablet  Commonly known as: Eliquis  Take 1 tablet by mouth 2 times daily     aspirin 81 MG EC tablet     calcitRIOL 0.25 MCG capsule  Commonly known as: ROCALTROL     dronedarone hcl 400 MG Tabs  Commonly known as: MULTAQ  Take 1 tablet by mouth 2 times daily (with meals)     ergocalciferol 1.25 MG (26747 UT) capsule  Commonly known as: ERGOCALCIFEROL     ferrous sulfate 325 (65 Fe) MG tablet  Commonly known as: IRON 325     furosemide 20 MG tablet  Commonly known as: LASIX     magnesium oxide 400 (240 Mg) MG tablet  Commonly known as: MAG-OX     methIMAzole 5 MG tablet  Commonly known as: TAPAZOLE     metoprolol succinate 25 MG extended release tablet  Commonly known as: TOPROL XL  Take 1 tablet by mouth daily     montelukast 10 MG tablet  Commonly

## 2025-07-16 NOTE — PROGRESS NOTES
7/15/25  1945: No bleeding, hematoma, or ecchymosis to bilateral groin sites s/p TAVR.    7/16/25  0000: No bleeding, hematoma, or ecchymosis to bilateral groin sites s/p TAVR.    0420: No bleeding, hematoma, or ecchymosis to bilateral groin sites s/p TAVR.  
Cardiac Rehab: Spoke with patient regarding referral to cardiac rehab. Patient meets admission criteria based on TAVR(7/15/25).  Written information about Cardiac Rehab given and reviewed with patient. Discussed lifestyle modifications to promote cardiac wellness. Patient indicated that she wants to participate in the cardiac rehab program at the [facility]. We will forward her referral for Cardiac Rehab to the [facility] Cardiac [facility] program as requested. Her Cardiologist is Dr. Egan.  
TRANSFER - IN REPORT:    Verbal report received from GLENNY Jimenez on Precious Burrell being received from PACU for routine progression of patient care      Report consisted of patient’s Situation, Background, Assessment and Recommendations(SBAR).     Information from the following report(s) SBAR, Procedure Summary, Intake/Output, and MAR was reviewed with the receiving nurse.    Opportunity for questions and clarification was provided.      Assessment completed upon patient’s arrival to unit and care assumed.     
1:20 PM.  [facility]  [phone]

## 2025-07-16 NOTE — CARE COORDINATION
Patient admitted for scheduled TAVR.   CM met with patient for assessment of discharge needs. Patient is alert and oriented X 4. Verified PCP, demographic, and insurance (Medicare/[facility]).  Patient is independent with ADLs and drives herself to appointments. Patient lives with her spouse, their son and grandson in a house. Reports her spouse is bedridden so her son and grandson care for him. Uses a cane to ambulate. CPAP nightly. DME: Cane, RW, CPAP. No supportive care currently.  Disposition: Home with family assistance.     07/16/25 1119   Service Assessment   Patient Orientation Alert and Oriented   Cognition Alert   History Provided By Patient   Primary Caregiver Self   Accompanied By/Relationship No one at bedside   Support Systems Spouse/Significant Other;Children;Family Members   PCP Verified by CM Yes   Last Visit to PCP Within last 3 months   Prior Functional Level Independent in ADLs/IADLs   Current Functional Level Independent in ADLs/IADLs   Can patient return to prior living arrangement Yes   Ability to make needs known: Good   Family able to assist with home care needs: Yes   Would you like for me to discuss the discharge plan with any other family members/significant others, and if so, who? Yes  (Son)   Financial Resources Medicare;Other (Comment)  ([facility] supplement)   Community Resources None   CM/SW Referral Other (see comment)  (N/A)   Social/Functional History   Lives With Spouse;Son;Family   Type of Home House   Home Equipment Cane;Walker - Rolling   Receives Help From Family   Prior Level of Assist for ADLs Independent   Prior Level of Assist for Homemaking Independent   Homemaking Responsibilities Yes   Ambulation Assistance Independent   Prior Level of Assist for Transfers Independent   Active  Yes   Mode of Transportation Car   Occupation Retired   Discharge Planning   Type of Residence House   Living Arrangements Spouse/Significant Other;Family Members;Children   Current Services 
Yes

## 2025-07-17 ENCOUNTER — TELEPHONE (OUTPATIENT)
Age: 80
End: 2025-07-17

## 2025-07-17 LAB
ABO + RH BLD: NORMAL
BLD PROD TYP BPU: NORMAL
BLOOD BANK DISPENSE STATUS: NORMAL
BLOOD GROUP ANTIBODIES SERPL: NORMAL
BPU ID: NORMAL
CROSSMATCH RESULT: NORMAL
SPECIMEN EXP DATE BLD: NORMAL
UNIT DIVISION: 0

## 2025-07-17 NOTE — TELEPHONE ENCOUNTER
Care Transitions Initial Follow Up Call    Call within 2 business days of discharge: Yes     Patient: Precious Burrell Patient : 1945 MRN: [id number]      RARS: Readmission Risk Score: 17.6       Spoke with: Patient    Discharge department/facility: [facility]    Non-face-to-face services provided:  low saturated fat, low cholesterol and low salt diet. The patient is instructed to advance activities as tolerated to the limit of fatigue or shortness of breath. The patient is instructed to avoid lifting anything heavier than 10 lbs for 2 weeks.  The patient is instructed to avoid any straining, stooping or squatting for 2 weeks. The patient is instructed not to drive for 1 week. The patient is instructed to watch the groin site for bleeding/oozing; if seen, the patient is instructed to apply firm pressure with a clean cloth and call [facility] at [phone]. The patient is instructed to watch for signs of infection which include: increasing area of redness, fever/hot to touch or purulent drainage at the groin site. The patient is instructed not to soak in a bathtub for 7-10 days, but is cleared to shower.      The patient is instructed to return to the ER immediately for any severe pain, color change, or temperature change in leg.      The patient is informed that prophylaxis for bacterial endocarditis is recommended for high-risk procedures such as all dental procedures that involve manipulation of gingival tissue, the periapical region of teeth, or perforation of the oral mucosa.      Patient reports she is doing well. Reviewed all discharge instructions with patient- focusing on activity and diet modifications. Patient reports dressing to groin clean, dry, and intact. Will removed dressing later today and monitor for signs of bleeding or infection. Patient is aware of her follow up appointments- she has requested to see if ECHO appointment can be moved to earlier time- I will send message

## 2025-07-21 ENCOUNTER — TELEPHONE (OUTPATIENT)
Age: 80
End: 2025-07-21

## 2025-07-21 NOTE — TELEPHONE ENCOUNTER
I called and informed the patient that I faxed the cardiac rehab papers today.   Patient verbalized understanding.

## 2025-07-21 NOTE — TELEPHONE ENCOUNTER
Patient calling stating that Dr. Egan done surgery on patient last week and he mentioned cardiac rehab but [facility] has not received anything on patient. Please call patient regarding this

## 2025-07-30 ENCOUNTER — OFFICE VISIT (OUTPATIENT)
Age: 80
End: 2025-07-30
Payer: MEDICARE

## 2025-07-30 VITALS
WEIGHT: 198 LBS | BODY MASS INDEX: 37.38 KG/M2 | DIASTOLIC BLOOD PRESSURE: 56 MMHG | SYSTOLIC BLOOD PRESSURE: 108 MMHG | HEART RATE: 78 BPM | HEIGHT: 61 IN

## 2025-07-30 DIAGNOSIS — E78.2 MIXED HYPERLIPIDEMIA: ICD-10-CM

## 2025-07-30 DIAGNOSIS — I10 ESSENTIAL HYPERTENSION: ICD-10-CM

## 2025-07-30 DIAGNOSIS — Z95.2 HISTORY OF TRANSCATHETER AORTIC VALVE REPLACEMENT (TAVR): ICD-10-CM

## 2025-07-30 DIAGNOSIS — I35.0 AORTIC VALVE STENOSIS, SEVERE: ICD-10-CM

## 2025-07-30 DIAGNOSIS — I48.0 PAF (PAROXYSMAL ATRIAL FIBRILLATION) (HCC): Primary | ICD-10-CM

## 2025-07-30 PROCEDURE — 1111F DSCHRG MED/CURRENT MED MERGE: CPT | Performed by: INTERNAL MEDICINE

## 2025-07-30 PROCEDURE — G8427 DOCREV CUR MEDS BY ELIG CLIN: HCPCS | Performed by: INTERNAL MEDICINE

## 2025-07-30 PROCEDURE — 1036F TOBACCO NON-USER: CPT | Performed by: INTERNAL MEDICINE

## 2025-07-30 PROCEDURE — 1123F ACP DISCUSS/DSCN MKR DOCD: CPT | Performed by: INTERNAL MEDICINE

## 2025-07-30 PROCEDURE — G8417 CALC BMI ABV UP PARAM F/U: HCPCS | Performed by: INTERNAL MEDICINE

## 2025-07-30 PROCEDURE — 1090F PRES/ABSN URINE INCON ASSESS: CPT | Performed by: INTERNAL MEDICINE

## 2025-07-30 PROCEDURE — G8400 PT W/DXA NO RESULTS DOC: HCPCS | Performed by: INTERNAL MEDICINE

## 2025-07-30 PROCEDURE — 99214 OFFICE O/P EST MOD 30 MIN: CPT | Performed by: INTERNAL MEDICINE

## 2025-07-30 PROCEDURE — 1126F AMNT PAIN NOTED NONE PRSNT: CPT | Performed by: INTERNAL MEDICINE

## 2025-07-30 PROCEDURE — 3074F SYST BP LT 130 MM HG: CPT | Performed by: INTERNAL MEDICINE

## 2025-07-30 PROCEDURE — 1160F RVW MEDS BY RX/DR IN RCRD: CPT | Performed by: INTERNAL MEDICINE

## 2025-07-30 PROCEDURE — 1159F MED LIST DOCD IN RCRD: CPT | Performed by: INTERNAL MEDICINE

## 2025-07-30 PROCEDURE — 3078F DIAST BP <80 MM HG: CPT | Performed by: INTERNAL MEDICINE

## 2025-07-30 ASSESSMENT — ENCOUNTER SYMPTOMS
EYE PAIN: 0
PHOTOPHOBIA: 0
BACK PAIN: 0
CHEST TIGHTNESS: 0
EYES NEGATIVE: 1
SHORTNESS OF BREATH: 0
ALLERGIC/IMMUNOLOGIC NEGATIVE: 1
RESPIRATORY NEGATIVE: 1
ABDOMINAL PAIN: 0
GASTROINTESTINAL NEGATIVE: 1

## 2025-07-30 NOTE — PROGRESS NOTES
[facility] CARDIOLOGY  [address] [place] [address], [address]  [place] [address]  PHONE: [phone]      25    NAME:  Precious Burrell  : 1945  MRN: [id number]         SUBJECTIVE:   Precious Burrell is a 79 y.o. female seen for follow up of:      Chief Complaint   Patient presents with    Atrial Fibrillation    Valvular Heart Disease     S/P TAVR        Cardiac Hx (Reviewed and summarized by me):  TAVR consult from Dr Downey  1) Severe AS              Echo 25 - LVEF 50-55% mean AV gradient 31 mmHg, DI 0.22 low flow low gradient severe.   TAVR 7/15/25 - 26 mm Jeanmarie Ultra via RFA (Green)   Echo 7/15/25 - LVEF 50-55% mean AV gradient 7 mmHg.  2) CAD              19 PCI to pLAD iFR guided (Rimasmith) - failed radial access.   25 - Cath - mild non obstructive disease (Green)  3) pAFib - on eliquis hx of anemia and GIB in   4) CTA/CT chest 25 - 26 mm Jeanmarie recommended. Calcium AV score 1053  5) lipid panel 25 - HDL 37, LDL 70, Trig 101, Ratio 3.4      HPI:  Reports an episode this weekend of chest pain and fatigue.  She spent most of the weekend in the house and it seems to have resolved on its own.  She denies chest pain currently.  She has been walking with her walker and feels okay.  She has not started cardiac rehab yet.    Past Medical History, Past Surgical History, Family history, Social History, and Medications were all reviewed with the patient today and updated as necessary.       Current Outpatient Medications:     vitamin B-12 (CYANOCOBALAMIN) 1000 MCG tablet, Take 1 tablet by mouth daily, Disp: , Rfl:     furosemide (LASIX) 20 MG tablet, Take 1 tablet by mouth daily, Disp: , Rfl:     omeprazole 20 MG EC tablet, Take 1 tablet by mouth in the morning and at bedtime, Disp: , Rfl:     calcitRIOL (ROCALTROL) 0.25 MCG capsule, TAKE 1 CAPSULE BY MOUTH THREE TIMES A WEEK ON MONDAY, WEDNESDAY AND FRIDAY, Disp: , Rfl:     Acetaminophen (TYLENOL ARTHRITIS PAIN PO),

## 2025-08-02 ENCOUNTER — HOSPITAL ENCOUNTER (INPATIENT)
Age: 80
LOS: 4 days | Discharge: HOME | DRG: 314 | End: 2025-08-06
Attending: INTERNAL MEDICINE | Admitting: INTERNAL MEDICINE
Payer: MEDICARE

## 2025-08-02 ENCOUNTER — APPOINTMENT (OUTPATIENT)
Dept: NON INVASIVE DIAGNOSTICS | Age: 80
DRG: 314 | End: 2025-08-02
Attending: INTERNAL MEDICINE
Payer: MEDICARE

## 2025-08-02 DIAGNOSIS — I87.2 CHRONIC VENOUS INSUFFICIENCY: ICD-10-CM

## 2025-08-02 DIAGNOSIS — I31.39 PERICARDIAL EFFUSION: Primary | ICD-10-CM

## 2025-08-02 LAB
ANION GAP SERPL CALC-SCNC: 21 MMOL/L (ref 7–16)
APPEARANCE FLD: NORMAL
BUN SERPL-MCNC: 40 MG/DL (ref 8–23)
CALCIUM SERPL-MCNC: 8.8 MG/DL (ref 8.8–10.2)
CHLORIDE SERPL-SCNC: 98 MMOL/L (ref 98–107)
CO2 SERPL-SCNC: 12 MMOL/L (ref 20–29)
COLOR FLD: NORMAL
CREAT SERPL-MCNC: 3.21 MG/DL (ref 0.6–1.1)
ECHO AO ASC DIAM: 3.7 CM
ECHO AO ASCENDING AORTA INDEX: 1.97 CM/M2
ECHO AO ROOT DIAM: 3.4 CM
ECHO AO ROOT INDEX: 1.81 CM/M2
ECHO AV AREA PEAK VELOCITY: 2.3 CM2
ECHO AV AREA VTI: 2.2 CM2
ECHO AV AREA/BSA PEAK VELOCITY: 1.2 CM2/M2
ECHO AV AREA/BSA VTI: 1.2 CM2/M2
ECHO AV MEAN GRADIENT: 5 MMHG
ECHO AV MEAN VELOCITY: 1.1 M/S
ECHO AV PEAK GRADIENT: 10 MMHG
ECHO AV PEAK VELOCITY: 1.6 M/S
ECHO AV VELOCITY RATIO: 0.75
ECHO AV VTI: 24.9 CM
ECHO BSA: 1.97 M2
ECHO EST RA PRESSURE: 15 MMHG
ECHO IVC PROX: 2.4 CM
ECHO LA AREA 2C: 25.2 CM2
ECHO LA AREA 4C: 17.7 CM2
ECHO LA DIAMETER INDEX: 2.29 CM/M2
ECHO LA DIAMETER: 4.3 CM
ECHO LA MAJOR AXIS: 5.2 CM
ECHO LA MINOR AXIS: 5.9 CM
ECHO LA TO AORTIC ROOT RATIO: 1.26
ECHO LA VOL BP: 70 ML (ref 22–52)
ECHO LA VOL MOD A2C: 88 ML (ref 22–52)
ECHO LA VOL MOD A4C: 49 ML (ref 22–52)
ECHO LA VOL/BSA BIPLANE: 37 ML/M2 (ref 16–34)
ECHO LA VOLUME INDEX MOD A2C: 47 ML/M2 (ref 16–34)
ECHO LA VOLUME INDEX MOD A4C: 26 ML/M2 (ref 16–34)
ECHO LV E' LATERAL VELOCITY: 5.11 CM/S
ECHO LV E' SEPTAL VELOCITY: 3.59 CM/S
ECHO LV EDV A2C: 66 ML
ECHO LV EDV A4C: 68 ML
ECHO LV EDV INDEX A4C: 36 ML/M2
ECHO LV EDV NDEX A2C: 35 ML/M2
ECHO LV EF PHYSICIAN: 50 %
ECHO LV EF PHYSICIAN: 50 %
ECHO LV EJECTION FRACTION A2C: 34 %
ECHO LV EJECTION FRACTION A4C: 28 %
ECHO LV ESV A2C: 44 ML
ECHO LV ESV A4C: 49 ML
ECHO LV ESV INDEX A2C: 23 ML/M2
ECHO LV ESV INDEX A4C: 26 ML/M2
ECHO LV FRACTIONAL SHORTENING: 10 % (ref 28–44)
ECHO LV INTERNAL DIMENSION DIASTOLE INDEX: 2.66 CM/M2
ECHO LV INTERNAL DIMENSION DIASTOLIC: 5 CM (ref 3.9–5.3)
ECHO LV INTERNAL DIMENSION SYSTOLIC INDEX: 2.39 CM/M2
ECHO LV INTERNAL DIMENSION SYSTOLIC: 4.5 CM
ECHO LV IVSD: 1.2 CM (ref 0.6–0.9)
ECHO LV MASS 2D: 233.7 G (ref 67–162)
ECHO LV MASS INDEX 2D: 124.3 G/M2 (ref 43–95)
ECHO LV POSTERIOR WALL DIASTOLIC: 1.2 CM (ref 0.6–0.9)
ECHO LV RELATIVE WALL THICKNESS RATIO: 0.48
ECHO LVOT AREA: 3.1 CM2
ECHO LVOT AV VTI INDEX: 0.71
ECHO LVOT DIAM: 2 CM
ECHO LVOT MEAN GRADIENT: 2 MMHG
ECHO LVOT PEAK GRADIENT: 6 MMHG
ECHO LVOT PEAK VELOCITY: 1.2 M/S
ECHO LVOT STROKE VOLUME INDEX: 29.4 ML/M2
ECHO LVOT SV: 55.3 ML
ECHO LVOT VTI: 17.6 CM
ECHO MV A VELOCITY: 0.99 M/S
ECHO MV AREA VTI: 2.3 CM2
ECHO MV E DECELERATION TIME (DT): 110 MS
ECHO MV E VELOCITY: 0.54 M/S
ECHO MV E/A RATIO: 0.55
ECHO MV E/E' LATERAL: 10.57
ECHO MV E/E' RATIO (AVERAGED): 12.8
ECHO MV E/E' SEPTAL: 15.04
ECHO MV LVOT VTI INDEX: 1.39
ECHO MV MAX VELOCITY: 1 M/S
ECHO MV MEAN GRADIENT: 2 MMHG
ECHO MV MEAN VELOCITY: 0.6 M/S
ECHO MV PEAK GRADIENT: 4 MMHG
ECHO MV VTI: 24.4 CM
ECHO PV ACCELERATION TIME (AT): 167 MS
ECHO PV MAX VELOCITY: 1 M/S
ECHO PV PEAK GRADIENT: 4 MMHG
ECHO RA AREA 4C: 11.1 CM2
ECHO RA END SYSTOLIC VOLUME APICAL 4 CHAMBER INDEX BSA: 12 ML/M2
ECHO RA VOLUME: 23 ML
ECHO RIGHT VENTRICULAR SYSTOLIC PRESSURE (RVSP): 29 MMHG
ECHO RV BASAL DIMENSION: 3.1 CM
ECHO RV EJECTION FRACTION 3D: 42 %
ECHO RV FRACTIONAL AREA CHANGE: 35 %
ECHO RV FRACTIONAL AREA CHANGE: 38 %
ECHO RV FREE WALL PEAK S': 11.2 CM/S
ECHO RV TAPSE: 1.6 CM (ref 1.7–?)
ECHO RV TAPSE: 1.6 CM (ref 1.7–?)
ECHO TV PEAK GRADIENT: 9 MMHG
ECHO TV REGURGITANT MAX VELOCITY: 1.89 M/S
ECHO TV REGURGITANT PEAK GRADIENT: 14 MMHG
ECHO TV REGURGITANT PEAK GRADIENT: 14 MMHG
EOSINOPHIL NFR BRONCH MANUAL: 1 %
ERYTHROCYTE [DISTWIDTH] IN BLOOD BY AUTOMATED COUNT: 15.1 % (ref 11.9–14.6)
GLUCOSE BLD STRIP.AUTO-MCNC: 119 MG/DL (ref 65–100)
GLUCOSE BLD STRIP.AUTO-MCNC: 125 MG/DL (ref 65–100)
GLUCOSE BLD STRIP.AUTO-MCNC: 130 MG/DL (ref 65–100)
GLUCOSE BLD STRIP.AUTO-MCNC: 133 MG/DL (ref 65–100)
GLUCOSE FLD-MCNC: 103 MG/DL
GLUCOSE SERPL-MCNC: 116 MG/DL (ref 70–99)
HCT VFR BLD AUTO: 26.8 % (ref 35.8–46.3)
HGB BLD-MCNC: 8.5 G/DL (ref 11.7–15.4)
LACTATE SERPL-SCNC: 1.3 MMOL/L (ref 0.5–2)
LACTATE SERPL-SCNC: 2.2 MMOL/L (ref 0.5–2)
LACTATE SERPL-SCNC: 6.3 MMOL/L (ref 0.5–2)
LDH FLD L TO P-CCNC: 2027 U/L
LYMPHOCYTES NFR BRONCH MANUAL: 23 %
MACROPHAGES NFR BRONCH MANUAL: 4 %
MAGNESIUM SERPL-MCNC: 2.1 MG/DL (ref 1.8–2.4)
MCH RBC QN AUTO: 29.5 PG (ref 26.1–32.9)
MCHC RBC AUTO-ENTMCNC: 31.7 G/DL (ref 31.4–35)
MCV RBC AUTO: 93.1 FL (ref 82–102)
NEUTROPHILS NFR BRONCH MANUAL: 72 %
NRBC # BLD: 0.03 K/UL (ref 0–0.2)
NUC CELL # FLD: 2807 /CU MM
PLATELET # BLD AUTO: 262 K/UL (ref 150–450)
PMV BLD AUTO: 11 FL (ref 9.4–12.3)
POTASSIUM SERPL-SCNC: 4.2 MMOL/L (ref 3.5–5.1)
PROT FLD-MCNC: 5.9 G/DL
RBC # BLD AUTO: 2.88 M/UL (ref 4.05–5.2)
RBC # FLD: NORMAL /CU MM
SERVICE CMNT-IMP: ABNORMAL
SODIUM SERPL-SCNC: 131 MMOL/L (ref 136–145)
SPECIMEN SOURCE FLD: NORMAL
WBC # BLD AUTO: 12.4 K/UL (ref 4.3–11.1)

## 2025-08-02 PROCEDURE — 83605 ASSAY OF LACTIC ACID: CPT

## 2025-08-02 PROCEDURE — 33016 PERICARDIOCENTESIS W/IMAGING: CPT | Performed by: INTERNAL MEDICINE

## 2025-08-02 PROCEDURE — 99152 MOD SED SAME PHYS/QHP 5/>YRS: CPT | Performed by: INTERNAL MEDICINE

## 2025-08-02 PROCEDURE — 87102 FUNGUS ISOLATION CULTURE: CPT

## 2025-08-02 PROCEDURE — 6360000002 HC RX W HCPCS: Performed by: INTERNAL MEDICINE

## 2025-08-02 PROCEDURE — 93308 TTE F-UP OR LMTD: CPT | Performed by: INTERNAL MEDICINE

## 2025-08-02 PROCEDURE — 99291 CRITICAL CARE FIRST HOUR: CPT | Performed by: INTERNAL MEDICINE

## 2025-08-02 PROCEDURE — 94640 AIRWAY INHALATION TREATMENT: CPT

## 2025-08-02 PROCEDURE — G0378 HOSPITAL OBSERVATION PER HR: HCPCS

## 2025-08-02 PROCEDURE — 88112 CYTOPATH CELL ENHANCE TECH: CPT

## 2025-08-02 PROCEDURE — 99153 MOD SED SAME PHYS/QHP EA: CPT | Performed by: INTERNAL MEDICINE

## 2025-08-02 PROCEDURE — 80048 BASIC METABOLIC PNL TOTAL CA: CPT

## 2025-08-02 PROCEDURE — C1894 INTRO/SHEATH, NON-LASER: HCPCS | Performed by: INTERNAL MEDICINE

## 2025-08-02 PROCEDURE — 93308 TTE F-UP OR LMTD: CPT

## 2025-08-02 PROCEDURE — 5A09357 ASSISTANCE WITH RESPIRATORY VENTILATION, LESS THAN 24 CONSECUTIVE HOURS, CONTINUOUS POSITIVE AIRWAY PRESSURE: ICD-10-PCS | Performed by: INTERNAL MEDICINE

## 2025-08-02 PROCEDURE — 94660 CPAP INITIATION&MGMT: CPT

## 2025-08-02 PROCEDURE — 87206 SMEAR FLUORESCENT/ACID STAI: CPT

## 2025-08-02 PROCEDURE — 82945 GLUCOSE OTHER FLUID: CPT

## 2025-08-02 PROCEDURE — 93306 TTE W/DOPPLER COMPLETE: CPT | Performed by: INTERNAL MEDICINE

## 2025-08-02 PROCEDURE — 6360000002 HC RX W HCPCS: Performed by: NURSE PRACTITIONER

## 2025-08-02 PROCEDURE — 84157 ASSAY OF PROTEIN OTHER: CPT

## 2025-08-02 PROCEDURE — 2580000003 HC RX 258: Performed by: INTERNAL MEDICINE

## 2025-08-02 PROCEDURE — 93306 TTE W/DOPPLER COMPLETE: CPT

## 2025-08-02 PROCEDURE — 87116 MYCOBACTERIA CULTURE: CPT

## 2025-08-02 PROCEDURE — 2709999900 HC NON-CHARGEABLE SUPPLY: Performed by: INTERNAL MEDICINE

## 2025-08-02 PROCEDURE — 2700000000 HC OXYGEN THERAPY PER DAY

## 2025-08-02 PROCEDURE — 82962 GLUCOSE BLOOD TEST: CPT

## 2025-08-02 PROCEDURE — 87205 SMEAR GRAM STAIN: CPT

## 2025-08-02 PROCEDURE — 85027 COMPLETE CBC AUTOMATED: CPT

## 2025-08-02 PROCEDURE — 89050 BODY FLUID CELL COUNT: CPT

## 2025-08-02 PROCEDURE — 2500000003 HC RX 250 WO HCPCS: Performed by: NURSE PRACTITIONER

## 2025-08-02 PROCEDURE — 87070 CULTURE OTHR SPECIMN AEROBIC: CPT

## 2025-08-02 PROCEDURE — 83735 ASSAY OF MAGNESIUM: CPT

## 2025-08-02 PROCEDURE — 0W9D3ZZ DRAINAGE OF PERICARDIAL CAVITY, PERCUTANEOUS APPROACH: ICD-10-PCS | Performed by: INTERNAL MEDICINE

## 2025-08-02 PROCEDURE — 36415 COLL VENOUS BLD VENIPUNCTURE: CPT

## 2025-08-02 PROCEDURE — 6370000000 HC RX 637 (ALT 250 FOR IP): Performed by: NURSE PRACTITIONER

## 2025-08-02 PROCEDURE — 83615 LACTATE (LD) (LDH) ENZYME: CPT

## 2025-08-02 PROCEDURE — C1729 CATH, DRAINAGE: HCPCS | Performed by: INTERNAL MEDICINE

## 2025-08-02 RX ORDER — SODIUM CHLORIDE 0.9 % (FLUSH) 0.9 %
5-40 SYRINGE (ML) INJECTION EVERY 12 HOURS SCHEDULED
Status: DISCONTINUED | OUTPATIENT
Start: 2025-08-02 | End: 2025-08-06 | Stop reason: HOSPADM

## 2025-08-02 RX ORDER — INSULIN LISPRO 100 [IU]/ML
0-8 INJECTION, SOLUTION INTRAVENOUS; SUBCUTANEOUS
Status: DISCONTINUED | OUTPATIENT
Start: 2025-08-02 | End: 2025-08-06 | Stop reason: HOSPADM

## 2025-08-02 RX ORDER — FENTANYL CITRATE 50 UG/ML
INJECTION, SOLUTION INTRAMUSCULAR; INTRAVENOUS PRN
Status: DISCONTINUED | OUTPATIENT
Start: 2025-08-02 | End: 2025-08-02 | Stop reason: HOSPADM

## 2025-08-02 RX ORDER — EZETIMIBE 10 MG/1
10 TABLET ORAL DAILY
Status: DISCONTINUED | OUTPATIENT
Start: 2025-08-02 | End: 2025-08-06 | Stop reason: HOSPADM

## 2025-08-02 RX ORDER — NITROGLYCERIN 0.4 MG/1
0.4 TABLET SUBLINGUAL EVERY 5 MIN PRN
Status: DISCONTINUED | OUTPATIENT
Start: 2025-08-02 | End: 2025-08-06 | Stop reason: HOSPADM

## 2025-08-02 RX ORDER — POTASSIUM CHLORIDE 1500 MG/1
40 TABLET, EXTENDED RELEASE ORAL PRN
Status: DISCONTINUED | OUTPATIENT
Start: 2025-08-02 | End: 2025-08-06 | Stop reason: HOSPADM

## 2025-08-02 RX ORDER — FUROSEMIDE 20 MG/1
20 TABLET ORAL DAILY
Status: DISCONTINUED | OUTPATIENT
Start: 2025-08-02 | End: 2025-08-06 | Stop reason: HOSPADM

## 2025-08-02 RX ORDER — POTASSIUM CHLORIDE 7.45 MG/ML
10 INJECTION INTRAVENOUS PRN
Status: DISCONTINUED | OUTPATIENT
Start: 2025-08-02 | End: 2025-08-06 | Stop reason: HOSPADM

## 2025-08-02 RX ORDER — ONDANSETRON 2 MG/ML
4 INJECTION INTRAMUSCULAR; INTRAVENOUS EVERY 6 HOURS PRN
Status: DISCONTINUED | OUTPATIENT
Start: 2025-08-02 | End: 2025-08-06 | Stop reason: HOSPADM

## 2025-08-02 RX ORDER — ARFORMOTEROL TARTRATE 15 UG/2ML
15 SOLUTION RESPIRATORY (INHALATION)
Status: DISCONTINUED | OUTPATIENT
Start: 2025-08-02 | End: 2025-08-03

## 2025-08-02 RX ORDER — METHIMAZOLE 5 MG/1
10 TABLET ORAL
Status: DISCONTINUED | OUTPATIENT
Start: 2025-08-03 | End: 2025-08-06 | Stop reason: HOSPADM

## 2025-08-02 RX ORDER — METHIMAZOLE 5 MG/1
5 TABLET ORAL
Status: DISCONTINUED | OUTPATIENT
Start: 2025-08-02 | End: 2025-08-06 | Stop reason: HOSPADM

## 2025-08-02 RX ORDER — IBUPROFEN 600 MG/1
1 TABLET ORAL PRN
Status: DISCONTINUED | OUTPATIENT
Start: 2025-08-02 | End: 2025-08-06 | Stop reason: HOSPADM

## 2025-08-02 RX ORDER — TRAMADOL HYDROCHLORIDE 50 MG/1
50 TABLET ORAL EVERY 6 HOURS PRN
Status: DISCONTINUED | OUTPATIENT
Start: 2025-08-02 | End: 2025-08-03

## 2025-08-02 RX ORDER — MIDAZOLAM HYDROCHLORIDE 1 MG/ML
INJECTION, SOLUTION INTRAMUSCULAR; INTRAVENOUS PRN
Status: DISCONTINUED | OUTPATIENT
Start: 2025-08-02 | End: 2025-08-02 | Stop reason: HOSPADM

## 2025-08-02 RX ORDER — MAGNESIUM SULFATE IN WATER 40 MG/ML
2000 INJECTION, SOLUTION INTRAVENOUS PRN
Status: DISCONTINUED | OUTPATIENT
Start: 2025-08-02 | End: 2025-08-06 | Stop reason: HOSPADM

## 2025-08-02 RX ORDER — SODIUM CHLORIDE 9 MG/ML
INJECTION, SOLUTION INTRAVENOUS CONTINUOUS
Status: DISCONTINUED | OUTPATIENT
Start: 2025-08-02 | End: 2025-08-03

## 2025-08-02 RX ORDER — POLYETHYLENE GLYCOL 3350 17 G/17G
17 POWDER, FOR SOLUTION ORAL DAILY PRN
Status: DISCONTINUED | OUTPATIENT
Start: 2025-08-02 | End: 2025-08-06 | Stop reason: HOSPADM

## 2025-08-02 RX ORDER — MORPHINE SULFATE 2 MG/ML
2 INJECTION, SOLUTION INTRAMUSCULAR; INTRAVENOUS EVERY 4 HOURS PRN
Status: DISCONTINUED | OUTPATIENT
Start: 2025-08-02 | End: 2025-08-03

## 2025-08-02 RX ORDER — LANOLIN ALCOHOL/MO/W.PET/CERES
1000 CREAM (GRAM) TOPICAL DAILY
Status: DISCONTINUED | OUTPATIENT
Start: 2025-08-02 | End: 2025-08-06 | Stop reason: HOSPADM

## 2025-08-02 RX ORDER — ONDANSETRON 4 MG/1
4 TABLET, ORALLY DISINTEGRATING ORAL EVERY 8 HOURS PRN
Status: DISCONTINUED | OUTPATIENT
Start: 2025-08-02 | End: 2025-08-06 | Stop reason: HOSPADM

## 2025-08-02 RX ORDER — PANTOPRAZOLE SODIUM 40 MG/1
40 TABLET, DELAYED RELEASE ORAL
Status: DISCONTINUED | OUTPATIENT
Start: 2025-08-03 | End: 2025-08-06 | Stop reason: HOSPADM

## 2025-08-02 RX ORDER — BUDESONIDE 0.25 MG/2ML
0.25 INHALANT ORAL
Status: DISCONTINUED | OUTPATIENT
Start: 2025-08-02 | End: 2025-08-03

## 2025-08-02 RX ORDER — FERROUS SULFATE 325(65) MG
325 TABLET ORAL
Status: DISCONTINUED | OUTPATIENT
Start: 2025-08-04 | End: 2025-08-06 | Stop reason: HOSPADM

## 2025-08-02 RX ORDER — SODIUM CHLORIDE 0.9 % (FLUSH) 0.9 %
5-40 SYRINGE (ML) INJECTION PRN
Status: DISCONTINUED | OUTPATIENT
Start: 2025-08-02 | End: 2025-08-06 | Stop reason: HOSPADM

## 2025-08-02 RX ORDER — ACETAMINOPHEN 650 MG/1
650 SUPPOSITORY RECTAL EVERY 6 HOURS PRN
Status: DISCONTINUED | OUTPATIENT
Start: 2025-08-02 | End: 2025-08-06 | Stop reason: HOSPADM

## 2025-08-02 RX ORDER — SODIUM CHLORIDE 9 MG/ML
INJECTION, SOLUTION INTRAVENOUS PRN
Status: DISCONTINUED | OUTPATIENT
Start: 2025-08-02 | End: 2025-08-06 | Stop reason: HOSPADM

## 2025-08-02 RX ORDER — METOPROLOL SUCCINATE 25 MG/1
25 TABLET, EXTENDED RELEASE ORAL DAILY
Status: DISCONTINUED | OUTPATIENT
Start: 2025-08-02 | End: 2025-08-06

## 2025-08-02 RX ORDER — METHIMAZOLE 5 MG/1
5 TABLET ORAL NIGHTLY
Status: DISCONTINUED | OUTPATIENT
Start: 2025-08-02 | End: 2025-08-02

## 2025-08-02 RX ORDER — MAGNESIUM HYDROXIDE/ALUMINUM HYDROXICE/SIMETHICONE 120; 1200; 1200 MG/30ML; MG/30ML; MG/30ML
30 SUSPENSION ORAL EVERY 6 HOURS PRN
Status: DISCONTINUED | OUTPATIENT
Start: 2025-08-02 | End: 2025-08-06 | Stop reason: HOSPADM

## 2025-08-02 RX ORDER — DEXTROSE MONOHYDRATE 100 MG/ML
INJECTION, SOLUTION INTRAVENOUS CONTINUOUS PRN
Status: DISCONTINUED | OUTPATIENT
Start: 2025-08-02 | End: 2025-08-06 | Stop reason: HOSPADM

## 2025-08-02 RX ORDER — METOPROLOL SUCCINATE 25 MG/1
25 TABLET, EXTENDED RELEASE ORAL DAILY
Status: DISCONTINUED | OUTPATIENT
Start: 2025-08-02 | End: 2025-08-02

## 2025-08-02 RX ORDER — ALLOPURINOL 100 MG/1
100 TABLET ORAL DAILY
Status: DISCONTINUED | OUTPATIENT
Start: 2025-08-02 | End: 2025-08-06 | Stop reason: HOSPADM

## 2025-08-02 RX ORDER — LOSARTAN POTASSIUM 50 MG/1
50 TABLET ORAL DAILY
Status: DISCONTINUED | OUTPATIENT
Start: 2025-08-02 | End: 2025-08-06 | Stop reason: HOSPADM

## 2025-08-02 RX ORDER — ALBUTEROL SULFATE 0.83 MG/ML
2.5 SOLUTION RESPIRATORY (INHALATION) EVERY 6 HOURS PRN
Status: DISCONTINUED | OUTPATIENT
Start: 2025-08-02 | End: 2025-08-06 | Stop reason: HOSPADM

## 2025-08-02 RX ORDER — ACETAMINOPHEN 325 MG/1
650 TABLET ORAL EVERY 6 HOURS PRN
Status: DISCONTINUED | OUTPATIENT
Start: 2025-08-02 | End: 2025-08-06 | Stop reason: HOSPADM

## 2025-08-02 RX ADMIN — ARFORMOTEROL TARTRATE 15 MCG: 15 SOLUTION RESPIRATORY (INHALATION) at 19:29

## 2025-08-02 RX ADMIN — BUDESONIDE 250 MCG: 0.25 SUSPENSION RESPIRATORY (INHALATION) at 19:29

## 2025-08-02 RX ADMIN — IPRATROPIUM BROMIDE 0.5 MG: 0.5 SOLUTION RESPIRATORY (INHALATION) at 19:29

## 2025-08-02 RX ADMIN — METOPROLOL SUCCINATE 25 MG: 25 TABLET, EXTENDED RELEASE ORAL at 13:11

## 2025-08-02 RX ADMIN — SODIUM CHLORIDE: 0.9 INJECTION, SOLUTION INTRAVENOUS at 20:13

## 2025-08-02 RX ADMIN — MORPHINE SULFATE 2 MG: 2 INJECTION, SOLUTION INTRAMUSCULAR; INTRAVENOUS at 14:15

## 2025-08-02 RX ADMIN — CYANOCOBALAMIN TAB 1000 MCG 1000 MCG: 1000 TAB at 12:33

## 2025-08-02 RX ADMIN — SODIUM CHLORIDE, PRESERVATIVE FREE 10 ML: 5 INJECTION INTRAVENOUS at 20:15

## 2025-08-02 RX ADMIN — DRONEDARONE 400 MG: 400 TABLET, FILM COATED ORAL at 13:05

## 2025-08-02 RX ADMIN — SODIUM CHLORIDE: 0.9 INJECTION, SOLUTION INTRAVENOUS at 12:10

## 2025-08-02 RX ADMIN — MORPHINE SULFATE 2 MG: 2 INJECTION, SOLUTION INTRAMUSCULAR; INTRAVENOUS at 22:34

## 2025-08-02 RX ADMIN — TRAMADOL HYDROCHLORIDE 50 MG: 50 TABLET, COATED ORAL at 12:27

## 2025-08-02 RX ADMIN — MORPHINE SULFATE 2 MG: 2 INJECTION, SOLUTION INTRAMUSCULAR; INTRAVENOUS at 18:27

## 2025-08-02 RX ADMIN — DRONEDARONE 400 MG: 400 TABLET, FILM COATED ORAL at 16:26

## 2025-08-02 RX ADMIN — IPRATROPIUM BROMIDE 0.5 MG: 0.5 SOLUTION RESPIRATORY (INHALATION) at 15:00

## 2025-08-02 RX ADMIN — EZETIMIBE 10 MG: 10 TABLET ORAL at 13:05

## 2025-08-02 RX ADMIN — ALLOPURINOL 100 MG: 100 TABLET ORAL at 12:33

## 2025-08-02 RX ADMIN — METHIMAZOLE 5 MG: 5 TABLET ORAL at 20:14

## 2025-08-02 RX ADMIN — SODIUM CHLORIDE, PRESERVATIVE FREE 10 ML: 5 INJECTION INTRAVENOUS at 10:28

## 2025-08-02 ASSESSMENT — PAIN SCALES - GENERAL
PAINLEVEL_OUTOF10: 5
PAINLEVEL_OUTOF10: 9
PAINLEVEL_OUTOF10: 2
PAINLEVEL_OUTOF10: 5
PAINLEVEL_OUTOF10: 5
PAINLEVEL_OUTOF10: 1
PAINLEVEL_OUTOF10: 10
PAINLEVEL_OUTOF10: 0
PAINLEVEL_OUTOF10: 10
PAINLEVEL_OUTOF10: 9

## 2025-08-02 ASSESSMENT — PAIN DESCRIPTION - DESCRIPTORS
DESCRIPTORS: ACHING

## 2025-08-02 ASSESSMENT — PAIN DESCRIPTION - ORIENTATION
ORIENTATION: LEFT
ORIENTATION: MID
ORIENTATION: MID
ORIENTATION: LEFT;OUTER
ORIENTATION: LEFT
ORIENTATION: MID

## 2025-08-02 ASSESSMENT — PAIN DESCRIPTION - LOCATION
LOCATION: CHEST

## 2025-08-02 ASSESSMENT — PAIN DESCRIPTION - FREQUENCY: FREQUENCY: INTERMITTENT

## 2025-08-02 ASSESSMENT — PAIN - FUNCTIONAL ASSESSMENT: PAIN_FUNCTIONAL_ASSESSMENT: ACTIVITIES ARE NOT PREVENTED

## 2025-08-02 ASSESSMENT — PAIN DESCRIPTION - ONSET: ONSET: GRADUAL

## 2025-08-02 ASSESSMENT — PAIN DESCRIPTION - PAIN TYPE: TYPE: SURGICAL PAIN

## 2025-08-03 LAB
ANION GAP SERPL CALC-SCNC: 14 MMOL/L (ref 7–16)
BUN SERPL-MCNC: 47 MG/DL (ref 8–23)
CALCIUM SERPL-MCNC: 8.2 MG/DL (ref 8.8–10.2)
CHLORIDE SERPL-SCNC: 103 MMOL/L (ref 98–107)
CO2 SERPL-SCNC: 15 MMOL/L (ref 20–29)
CREAT SERPL-MCNC: 2.93 MG/DL (ref 0.6–1.1)
ERYTHROCYTE [DISTWIDTH] IN BLOOD BY AUTOMATED COUNT: 15 % (ref 11.9–14.6)
GLUCOSE BLD STRIP.AUTO-MCNC: 128 MG/DL (ref 65–100)
GLUCOSE BLD STRIP.AUTO-MCNC: 130 MG/DL (ref 65–100)
GLUCOSE BLD STRIP.AUTO-MCNC: 149 MG/DL (ref 65–100)
GLUCOSE BLD STRIP.AUTO-MCNC: 151 MG/DL (ref 65–100)
GLUCOSE SERPL-MCNC: 120 MG/DL (ref 70–99)
HCT VFR BLD AUTO: 25.9 % (ref 35.8–46.3)
HGB BLD-MCNC: 8.3 G/DL (ref 11.7–15.4)
MCH RBC QN AUTO: 29.1 PG (ref 26.1–32.9)
MCHC RBC AUTO-ENTMCNC: 32 G/DL (ref 31.4–35)
MCV RBC AUTO: 90.9 FL (ref 82–102)
NRBC # BLD: 0.05 K/UL (ref 0–0.2)
PLATELET # BLD AUTO: 239 K/UL (ref 150–450)
PMV BLD AUTO: 10.6 FL (ref 9.4–12.3)
POTASSIUM SERPL-SCNC: 4.4 MMOL/L (ref 3.5–5.1)
RBC # BLD AUTO: 2.85 M/UL (ref 4.05–5.2)
SERVICE CMNT-IMP: ABNORMAL
SODIUM SERPL-SCNC: 133 MMOL/L (ref 136–145)
WBC # BLD AUTO: 11.3 K/UL (ref 4.3–11.1)

## 2025-08-03 PROCEDURE — 82962 GLUCOSE BLOOD TEST: CPT

## 2025-08-03 PROCEDURE — 94640 AIRWAY INHALATION TREATMENT: CPT

## 2025-08-03 PROCEDURE — 2580000003 HC RX 258: Performed by: INTERNAL MEDICINE

## 2025-08-03 PROCEDURE — 2140000000 HC CCU INTERMEDIATE R&B

## 2025-08-03 PROCEDURE — 6360000002 HC RX W HCPCS: Performed by: NURSE PRACTITIONER

## 2025-08-03 PROCEDURE — 94660 CPAP INITIATION&MGMT: CPT

## 2025-08-03 PROCEDURE — 94760 N-INVAS EAR/PLS OXIMETRY 1: CPT

## 2025-08-03 PROCEDURE — 99232 SBSQ HOSP IP/OBS MODERATE 35: CPT | Performed by: INTERNAL MEDICINE

## 2025-08-03 PROCEDURE — 2060000000 HC ICU INTERMEDIATE R&B

## 2025-08-03 PROCEDURE — 85027 COMPLETE CBC AUTOMATED: CPT

## 2025-08-03 PROCEDURE — 2500000003 HC RX 250 WO HCPCS: Performed by: NURSE PRACTITIONER

## 2025-08-03 PROCEDURE — 36415 COLL VENOUS BLD VENIPUNCTURE: CPT

## 2025-08-03 PROCEDURE — 6370000000 HC RX 637 (ALT 250 FOR IP): Performed by: NURSE PRACTITIONER

## 2025-08-03 PROCEDURE — 80048 BASIC METABOLIC PNL TOTAL CA: CPT

## 2025-08-03 PROCEDURE — 6360000002 HC RX W HCPCS: Performed by: INTERNAL MEDICINE

## 2025-08-03 RX ORDER — ARFORMOTEROL TARTRATE 15 UG/2ML
15 SOLUTION RESPIRATORY (INHALATION)
Status: DISCONTINUED | OUTPATIENT
Start: 2025-08-03 | End: 2025-08-06

## 2025-08-03 RX ORDER — ARFORMOTEROL TARTRATE 15 UG/2ML
15 SOLUTION RESPIRATORY (INHALATION)
Status: DISCONTINUED | OUTPATIENT
Start: 2025-08-04 | End: 2025-08-03

## 2025-08-03 RX ORDER — BUDESONIDE 0.25 MG/2ML
0.25 INHALANT ORAL
Status: DISCONTINUED | OUTPATIENT
Start: 2025-08-03 | End: 2025-08-06

## 2025-08-03 RX ORDER — BUDESONIDE 0.25 MG/2ML
0.25 INHALANT ORAL
Status: DISCONTINUED | OUTPATIENT
Start: 2025-08-04 | End: 2025-08-03

## 2025-08-03 RX ORDER — MORPHINE SULFATE 4 MG/ML
4 INJECTION INTRAVENOUS EVERY 4 HOURS PRN
Status: DISCONTINUED | OUTPATIENT
Start: 2025-08-03 | End: 2025-08-06 | Stop reason: HOSPADM

## 2025-08-03 RX ORDER — MORPHINE SULFATE 2 MG/ML
2 INJECTION, SOLUTION INTRAMUSCULAR; INTRAVENOUS ONCE
Status: COMPLETED | OUTPATIENT
Start: 2025-08-03 | End: 2025-08-03

## 2025-08-03 RX ADMIN — IPRATROPIUM BROMIDE 0.5 MG: 0.5 SOLUTION RESPIRATORY (INHALATION) at 02:48

## 2025-08-03 RX ADMIN — BUDESONIDE 250 MCG: 0.25 SUSPENSION RESPIRATORY (INHALATION) at 07:58

## 2025-08-03 RX ADMIN — SODIUM CHLORIDE, PRESERVATIVE FREE 10 ML: 5 INJECTION INTRAVENOUS at 08:44

## 2025-08-03 RX ADMIN — BUDESONIDE 250 MCG: 0.25 SUSPENSION RESPIRATORY (INHALATION) at 20:56

## 2025-08-03 RX ADMIN — DRONEDARONE 400 MG: 400 TABLET, FILM COATED ORAL at 07:00

## 2025-08-03 RX ADMIN — SODIUM CHLORIDE: 0.9 INJECTION, SOLUTION INTRAVENOUS at 04:53

## 2025-08-03 RX ADMIN — EZETIMIBE 10 MG: 10 TABLET ORAL at 08:38

## 2025-08-03 RX ADMIN — MORPHINE SULFATE 4 MG: 4 INJECTION INTRAVENOUS at 13:01

## 2025-08-03 RX ADMIN — ALLOPURINOL 100 MG: 100 TABLET ORAL at 08:36

## 2025-08-03 RX ADMIN — PANTOPRAZOLE SODIUM 40 MG: 40 TABLET, DELAYED RELEASE ORAL at 07:00

## 2025-08-03 RX ADMIN — IPRATROPIUM BROMIDE 0.5 MG: 0.5 SOLUTION RESPIRATORY (INHALATION) at 20:57

## 2025-08-03 RX ADMIN — MORPHINE SULFATE 2 MG: 2 INJECTION, SOLUTION INTRAMUSCULAR; INTRAVENOUS at 03:05

## 2025-08-03 RX ADMIN — METHIMAZOLE 10 MG: 5 TABLET ORAL at 20:40

## 2025-08-03 RX ADMIN — MORPHINE SULFATE 4 MG: 4 INJECTION INTRAVENOUS at 21:20

## 2025-08-03 RX ADMIN — MORPHINE SULFATE 2 MG: 2 INJECTION, SOLUTION INTRAMUSCULAR; INTRAVENOUS at 06:59

## 2025-08-03 RX ADMIN — SODIUM CHLORIDE, PRESERVATIVE FREE 10 ML: 5 INJECTION INTRAVENOUS at 20:39

## 2025-08-03 RX ADMIN — ARFORMOTEROL TARTRATE 15 MCG: 15 SOLUTION RESPIRATORY (INHALATION) at 20:56

## 2025-08-03 RX ADMIN — CYANOCOBALAMIN TAB 1000 MCG 1000 MCG: 1000 TAB at 08:36

## 2025-08-03 RX ADMIN — DRONEDARONE 400 MG: 400 TABLET, FILM COATED ORAL at 16:23

## 2025-08-03 RX ADMIN — ARFORMOTEROL TARTRATE 15 MCG: 15 SOLUTION RESPIRATORY (INHALATION) at 07:58

## 2025-08-03 RX ADMIN — METOPROLOL SUCCINATE 25 MG: 25 TABLET, EXTENDED RELEASE ORAL at 08:36

## 2025-08-03 RX ADMIN — IPRATROPIUM BROMIDE 0.5 MG: 0.5 SOLUTION RESPIRATORY (INHALATION) at 14:46

## 2025-08-03 RX ADMIN — MORPHINE SULFATE 2 MG: 2 INJECTION, SOLUTION INTRAMUSCULAR; INTRAVENOUS at 08:35

## 2025-08-03 RX ADMIN — IPRATROPIUM BROMIDE 0.5 MG: 0.5 SOLUTION RESPIRATORY (INHALATION) at 07:58

## 2025-08-03 ASSESSMENT — PAIN SCALES - GENERAL
PAINLEVEL_OUTOF10: 4
PAINLEVEL_OUTOF10: 0
PAINLEVEL_OUTOF10: 1
PAINLEVEL_OUTOF10: 5
PAINLEVEL_OUTOF10: 7
PAINLEVEL_OUTOF10: 6
PAINLEVEL_OUTOF10: 5
PAINLEVEL_OUTOF10: 1
PAINLEVEL_OUTOF10: 6
PAINLEVEL_OUTOF10: 2
PAINLEVEL_OUTOF10: 0

## 2025-08-03 ASSESSMENT — PAIN DESCRIPTION - ORIENTATION
ORIENTATION: LEFT
ORIENTATION: MID
ORIENTATION: LEFT

## 2025-08-03 ASSESSMENT — PAIN DESCRIPTION - DESCRIPTORS
DESCRIPTORS: ACHING
DESCRIPTORS: SORE;ACHING
DESCRIPTORS: SORE;ACHING
DESCRIPTORS: SORE
DESCRIPTORS: ACHING
DESCRIPTORS: BURNING

## 2025-08-03 ASSESSMENT — PAIN SCALES - WONG BAKER: WONGBAKER_NUMERICALRESPONSE: NO HURT

## 2025-08-03 ASSESSMENT — PAIN DESCRIPTION - ONSET
ONSET: GRADUAL

## 2025-08-03 ASSESSMENT — PAIN - FUNCTIONAL ASSESSMENT
PAIN_FUNCTIONAL_ASSESSMENT: ACTIVITIES ARE NOT PREVENTED
PAIN_FUNCTIONAL_ASSESSMENT: PREVENTS OR INTERFERES SOME ACTIVE ACTIVITIES AND ADLS

## 2025-08-03 ASSESSMENT — PAIN DESCRIPTION - LOCATION
LOCATION: CHEST
LOCATION: INCISION
LOCATION: CHEST
LOCATION: CHEST

## 2025-08-03 ASSESSMENT — PAIN DESCRIPTION - PAIN TYPE
TYPE: SURGICAL PAIN

## 2025-08-03 ASSESSMENT — PAIN DESCRIPTION - FREQUENCY
FREQUENCY: INTERMITTENT

## 2025-08-04 LAB
ACID FAST STN SPEC: NEGATIVE
ANION GAP SERPL CALC-SCNC: 15 MMOL/L (ref 7–16)
BACTERIA SPEC CULT: NORMAL
BUN SERPL-MCNC: 63 MG/DL (ref 8–23)
CALCIUM SERPL-MCNC: 8.3 MG/DL (ref 8.8–10.2)
CHLORIDE SERPL-SCNC: 99 MMOL/L (ref 98–107)
CO2 SERPL-SCNC: 16 MMOL/L (ref 20–29)
CREAT SERPL-MCNC: 2.76 MG/DL (ref 0.6–1.1)
CYTOLOGY-NON GYN: NORMAL
ERYTHROCYTE [DISTWIDTH] IN BLOOD BY AUTOMATED COUNT: 15.3 % (ref 11.9–14.6)
GLUCOSE BLD STRIP.AUTO-MCNC: 126 MG/DL (ref 65–100)
GLUCOSE BLD STRIP.AUTO-MCNC: 127 MG/DL (ref 65–100)
GLUCOSE BLD STRIP.AUTO-MCNC: 127 MG/DL (ref 65–100)
GLUCOSE BLD STRIP.AUTO-MCNC: 142 MG/DL (ref 65–100)
GLUCOSE SERPL-MCNC: 131 MG/DL (ref 70–99)
GRAM STN SPEC: NORMAL
GRAM STN SPEC: NORMAL
HCT VFR BLD AUTO: 24.3 % (ref 35.8–46.3)
HGB BLD-MCNC: 7.9 G/DL (ref 11.7–15.4)
MCH RBC QN AUTO: 29.4 PG (ref 26.1–32.9)
MCHC RBC AUTO-ENTMCNC: 32.5 G/DL (ref 31.4–35)
MCV RBC AUTO: 90.3 FL (ref 82–102)
MYCOBACTERIUM SPEC QL CULT: NORMAL
NRBC # BLD: 0.26 K/UL (ref 0–0.2)
PLATELET # BLD AUTO: 290 K/UL (ref 150–450)
PMV BLD AUTO: 10.6 FL (ref 9.4–12.3)
POTASSIUM SERPL-SCNC: 4.9 MMOL/L (ref 3.5–5.1)
RBC # BLD AUTO: 2.69 M/UL (ref 4.05–5.2)
SERVICE CMNT-IMP: ABNORMAL
SERVICE CMNT-IMP: NORMAL
SODIUM SERPL-SCNC: 129 MMOL/L (ref 136–145)
SPECIMEN PREPARATION: NORMAL
SPECIMEN SOURCE: NORMAL
SPECIMEN SOURCE: NORMAL
WBC # BLD AUTO: 13.8 K/UL (ref 4.3–11.1)

## 2025-08-04 PROCEDURE — 36415 COLL VENOUS BLD VENIPUNCTURE: CPT

## 2025-08-04 PROCEDURE — 2500000003 HC RX 250 WO HCPCS: Performed by: NURSE PRACTITIONER

## 2025-08-04 PROCEDURE — 82962 GLUCOSE BLOOD TEST: CPT

## 2025-08-04 PROCEDURE — 97165 OT EVAL LOW COMPLEX 30 MIN: CPT

## 2025-08-04 PROCEDURE — 94640 AIRWAY INHALATION TREATMENT: CPT

## 2025-08-04 PROCEDURE — 94761 N-INVAS EAR/PLS OXIMETRY MLT: CPT

## 2025-08-04 PROCEDURE — 97535 SELF CARE MNGMENT TRAINING: CPT

## 2025-08-04 PROCEDURE — 97161 PT EVAL LOW COMPLEX 20 MIN: CPT

## 2025-08-04 PROCEDURE — 80048 BASIC METABOLIC PNL TOTAL CA: CPT

## 2025-08-04 PROCEDURE — 2140000000 HC CCU INTERMEDIATE R&B

## 2025-08-04 PROCEDURE — 6370000000 HC RX 637 (ALT 250 FOR IP): Performed by: NURSE PRACTITIONER

## 2025-08-04 PROCEDURE — 99232 SBSQ HOSP IP/OBS MODERATE 35: CPT | Performed by: INTERNAL MEDICINE

## 2025-08-04 PROCEDURE — 97112 NEUROMUSCULAR REEDUCATION: CPT

## 2025-08-04 PROCEDURE — 6360000002 HC RX W HCPCS: Performed by: INTERNAL MEDICINE

## 2025-08-04 PROCEDURE — 94660 CPAP INITIATION&MGMT: CPT

## 2025-08-04 PROCEDURE — 97530 THERAPEUTIC ACTIVITIES: CPT

## 2025-08-04 PROCEDURE — 85027 COMPLETE CBC AUTOMATED: CPT

## 2025-08-04 RX ADMIN — PANTOPRAZOLE SODIUM 40 MG: 40 TABLET, DELAYED RELEASE ORAL at 06:21

## 2025-08-04 RX ADMIN — IPRATROPIUM BROMIDE 0.5 MG: 0.5 SOLUTION RESPIRATORY (INHALATION) at 14:47

## 2025-08-04 RX ADMIN — CYANOCOBALAMIN TAB 1000 MCG 1000 MCG: 1000 TAB at 08:27

## 2025-08-04 RX ADMIN — METHIMAZOLE 5 MG: 5 TABLET ORAL at 20:40

## 2025-08-04 RX ADMIN — BUDESONIDE 250 MCG: 0.25 SUSPENSION RESPIRATORY (INHALATION) at 19:24

## 2025-08-04 RX ADMIN — ARFORMOTEROL TARTRATE 15 MCG: 15 SOLUTION RESPIRATORY (INHALATION) at 07:44

## 2025-08-04 RX ADMIN — METOPROLOL SUCCINATE 25 MG: 25 TABLET, EXTENDED RELEASE ORAL at 08:27

## 2025-08-04 RX ADMIN — ALLOPURINOL 100 MG: 100 TABLET ORAL at 08:27

## 2025-08-04 RX ADMIN — SODIUM CHLORIDE, PRESERVATIVE FREE 10 ML: 5 INJECTION INTRAVENOUS at 08:29

## 2025-08-04 RX ADMIN — BUDESONIDE 250 MCG: 0.25 SUSPENSION RESPIRATORY (INHALATION) at 07:44

## 2025-08-04 RX ADMIN — ARFORMOTEROL TARTRATE 15 MCG: 15 SOLUTION RESPIRATORY (INHALATION) at 19:23

## 2025-08-04 RX ADMIN — IPRATROPIUM BROMIDE 0.5 MG: 0.5 SOLUTION RESPIRATORY (INHALATION) at 19:23

## 2025-08-04 RX ADMIN — EZETIMIBE 10 MG: 10 TABLET ORAL at 08:27

## 2025-08-04 RX ADMIN — IPRATROPIUM BROMIDE 0.5 MG: 0.5 SOLUTION RESPIRATORY (INHALATION) at 07:44

## 2025-08-04 RX ADMIN — DRONEDARONE 400 MG: 400 TABLET, FILM COATED ORAL at 08:27

## 2025-08-04 RX ADMIN — SODIUM CHLORIDE, PRESERVATIVE FREE 10 ML: 5 INJECTION INTRAVENOUS at 20:41

## 2025-08-04 RX ADMIN — DRONEDARONE 400 MG: 400 TABLET, FILM COATED ORAL at 17:23

## 2025-08-04 RX ADMIN — FERROUS SULFATE TAB 325 MG (65 MG ELEMENTAL FE) 325 MG: 325 (65 FE) TAB at 08:27

## 2025-08-04 ASSESSMENT — PAIN SCALES - WONG BAKER
WONGBAKER_NUMERICALRESPONSE: NO HURT
WONGBAKER_NUMERICALRESPONSE: NO HURT

## 2025-08-04 ASSESSMENT — PAIN - FUNCTIONAL ASSESSMENT
PAIN_FUNCTIONAL_ASSESSMENT: ACTIVITIES ARE NOT PREVENTED

## 2025-08-04 ASSESSMENT — PAIN DESCRIPTION - ORIENTATION
ORIENTATION: LEFT

## 2025-08-04 ASSESSMENT — PAIN DESCRIPTION - LOCATION
LOCATION: INCISION
LOCATION: CHEST;INCISION
LOCATION: INCISION;CHEST
LOCATION: INCISION

## 2025-08-04 ASSESSMENT — PAIN DESCRIPTION - ONSET
ONSET: GRADUAL
ONSET: GRADUAL

## 2025-08-04 ASSESSMENT — PAIN DESCRIPTION - FREQUENCY
FREQUENCY: INTERMITTENT
FREQUENCY: INTERMITTENT

## 2025-08-04 ASSESSMENT — PAIN DESCRIPTION - DESCRIPTORS
DESCRIPTORS: SORE

## 2025-08-04 ASSESSMENT — PAIN SCALES - GENERAL
PAINLEVEL_OUTOF10: 0
PAINLEVEL_OUTOF10: 2
PAINLEVEL_OUTOF10: 0
PAINLEVEL_OUTOF10: 1
PAINLEVEL_OUTOF10: 3
PAINLEVEL_OUTOF10: 1

## 2025-08-04 ASSESSMENT — PAIN DESCRIPTION - PAIN TYPE
TYPE: SURGICAL PAIN

## 2025-08-05 LAB
ANION GAP SERPL CALC-SCNC: 14 MMOL/L (ref 7–16)
APPEARANCE UR: CLEAR
BILIRUB UR QL: NEGATIVE
BUN SERPL-MCNC: 65 MG/DL (ref 8–23)
CALCIUM SERPL-MCNC: 8.6 MG/DL (ref 8.8–10.2)
CHLORIDE SERPL-SCNC: 104 MMOL/L (ref 98–107)
CO2 SERPL-SCNC: 15 MMOL/L (ref 20–29)
COLOR UR: NORMAL
CREAT SERPL-MCNC: 2.57 MG/DL (ref 0.6–1.1)
ERYTHROCYTE [DISTWIDTH] IN BLOOD BY AUTOMATED COUNT: 15.3 % (ref 11.9–14.6)
GLUCOSE BLD STRIP.AUTO-MCNC: 109 MG/DL (ref 65–100)
GLUCOSE BLD STRIP.AUTO-MCNC: 115 MG/DL (ref 65–100)
GLUCOSE BLD STRIP.AUTO-MCNC: 123 MG/DL (ref 65–100)
GLUCOSE BLD STRIP.AUTO-MCNC: 129 MG/DL (ref 65–100)
GLUCOSE SERPL-MCNC: 113 MG/DL (ref 70–99)
GLUCOSE UR STRIP.AUTO-MCNC: NEGATIVE MG/DL
HCT VFR BLD AUTO: 27.9 % (ref 35.8–46.3)
HGB BLD-MCNC: 8.7 G/DL (ref 11.7–15.4)
HGB UR QL STRIP: NEGATIVE
KETONES UR QL STRIP.AUTO: NEGATIVE MG/DL
LEUKOCYTE ESTERASE UR QL STRIP.AUTO: NEGATIVE
MCH RBC QN AUTO: 29.3 PG (ref 26.1–32.9)
MCHC RBC AUTO-ENTMCNC: 31.2 G/DL (ref 31.4–35)
MCV RBC AUTO: 93.9 FL (ref 82–102)
NITRITE UR QL STRIP.AUTO: NEGATIVE
NRBC # BLD: 0.3 K/UL (ref 0–0.2)
PH UR STRIP: 5.5 (ref 5–9)
PLATELET # BLD AUTO: 320 K/UL (ref 150–450)
PMV BLD AUTO: 10.2 FL (ref 9.4–12.3)
POTASSIUM SERPL-SCNC: 4.6 MMOL/L (ref 3.5–5.1)
PROCALCITONIN SERPL-MCNC: 0.52 NG/ML (ref 0–0.1)
PROT UR STRIP-MCNC: NEGATIVE MG/DL
RBC # BLD AUTO: 2.97 M/UL (ref 4.05–5.2)
SERVICE CMNT-IMP: ABNORMAL
SODIUM SERPL-SCNC: 133 MMOL/L (ref 136–145)
SP GR UR REFRACTOMETRY: 1.01 (ref 1–1.02)
UROBILINOGEN UR QL STRIP.AUTO: 0.2 EU/DL (ref 0.2–1)
WBC # BLD AUTO: 14.9 K/UL (ref 4.3–11.1)

## 2025-08-05 PROCEDURE — 2580000003 HC RX 258: Performed by: STUDENT IN AN ORGANIZED HEALTH CARE EDUCATION/TRAINING PROGRAM

## 2025-08-05 PROCEDURE — 36415 COLL VENOUS BLD VENIPUNCTURE: CPT

## 2025-08-05 PROCEDURE — 82962 GLUCOSE BLOOD TEST: CPT

## 2025-08-05 PROCEDURE — 85027 COMPLETE CBC AUTOMATED: CPT

## 2025-08-05 PROCEDURE — 2500000003 HC RX 250 WO HCPCS: Performed by: NURSE PRACTITIONER

## 2025-08-05 PROCEDURE — 6360000002 HC RX W HCPCS: Performed by: NURSE PRACTITIONER

## 2025-08-05 PROCEDURE — 2140000000 HC CCU INTERMEDIATE R&B

## 2025-08-05 PROCEDURE — 6370000000 HC RX 637 (ALT 250 FOR IP): Performed by: NURSE PRACTITIONER

## 2025-08-05 PROCEDURE — 99232 SBSQ HOSP IP/OBS MODERATE 35: CPT | Performed by: INTERNAL MEDICINE

## 2025-08-05 PROCEDURE — 81003 URINALYSIS AUTO W/O SCOPE: CPT

## 2025-08-05 PROCEDURE — 94640 AIRWAY INHALATION TREATMENT: CPT

## 2025-08-05 PROCEDURE — 94761 N-INVAS EAR/PLS OXIMETRY MLT: CPT

## 2025-08-05 PROCEDURE — 80048 BASIC METABOLIC PNL TOTAL CA: CPT

## 2025-08-05 PROCEDURE — 84145 PROCALCITONIN (PCT): CPT

## 2025-08-05 PROCEDURE — 6360000002 HC RX W HCPCS: Performed by: INTERNAL MEDICINE

## 2025-08-05 PROCEDURE — 94760 N-INVAS EAR/PLS OXIMETRY 1: CPT

## 2025-08-05 RX ORDER — 0.9 % SODIUM CHLORIDE 0.9 %
500 INTRAVENOUS SOLUTION INTRAVENOUS ONCE
Status: COMPLETED | OUTPATIENT
Start: 2025-08-05 | End: 2025-08-05

## 2025-08-05 RX ADMIN — SODIUM CHLORIDE 500 ML: 0.9 INJECTION, SOLUTION INTRAVENOUS at 12:34

## 2025-08-05 RX ADMIN — BUDESONIDE 250 MCG: 0.25 SUSPENSION RESPIRATORY (INHALATION) at 07:07

## 2025-08-05 RX ADMIN — MORPHINE SULFATE 4 MG: 4 INJECTION INTRAVENOUS at 04:28

## 2025-08-05 RX ADMIN — CYANOCOBALAMIN TAB 1000 MCG 1000 MCG: 1000 TAB at 07:58

## 2025-08-05 RX ADMIN — SODIUM CHLORIDE, PRESERVATIVE FREE 10 ML: 5 INJECTION INTRAVENOUS at 19:55

## 2025-08-05 RX ADMIN — SODIUM CHLORIDE, PRESERVATIVE FREE 10 ML: 5 INJECTION INTRAVENOUS at 07:58

## 2025-08-05 RX ADMIN — ALLOPURINOL 100 MG: 100 TABLET ORAL at 07:58

## 2025-08-05 RX ADMIN — IPRATROPIUM BROMIDE 0.5 MG: 0.5 SOLUTION RESPIRATORY (INHALATION) at 07:07

## 2025-08-05 RX ADMIN — IPRATROPIUM BROMIDE 0.5 MG: 0.5 SOLUTION RESPIRATORY (INHALATION) at 19:26

## 2025-08-05 RX ADMIN — PANTOPRAZOLE SODIUM 40 MG: 40 TABLET, DELAYED RELEASE ORAL at 05:40

## 2025-08-05 RX ADMIN — METHIMAZOLE 10 MG: 5 TABLET ORAL at 19:53

## 2025-08-05 RX ADMIN — EZETIMIBE 10 MG: 10 TABLET ORAL at 07:58

## 2025-08-05 RX ADMIN — IPRATROPIUM BROMIDE 0.5 MG: 0.5 SOLUTION RESPIRATORY (INHALATION) at 14:41

## 2025-08-05 RX ADMIN — ARFORMOTEROL TARTRATE 15 MCG: 15 SOLUTION RESPIRATORY (INHALATION) at 19:26

## 2025-08-05 RX ADMIN — BUDESONIDE 250 MCG: 0.25 SUSPENSION RESPIRATORY (INHALATION) at 19:26

## 2025-08-05 RX ADMIN — METOPROLOL SUCCINATE 25 MG: 25 TABLET, EXTENDED RELEASE ORAL at 07:58

## 2025-08-05 RX ADMIN — ARFORMOTEROL TARTRATE 15 MCG: 15 SOLUTION RESPIRATORY (INHALATION) at 07:07

## 2025-08-05 RX ADMIN — DRONEDARONE 400 MG: 400 TABLET, FILM COATED ORAL at 07:58

## 2025-08-05 RX ADMIN — DRONEDARONE 400 MG: 400 TABLET, FILM COATED ORAL at 16:53

## 2025-08-05 ASSESSMENT — PAIN SCALES - WONG BAKER: WONGBAKER_NUMERICALRESPONSE: NO HURT

## 2025-08-05 ASSESSMENT — PAIN DESCRIPTION - ONSET
ONSET: GRADUAL
ONSET: GRADUAL

## 2025-08-05 ASSESSMENT — PAIN DESCRIPTION - FREQUENCY
FREQUENCY: INTERMITTENT
FREQUENCY: INTERMITTENT

## 2025-08-05 ASSESSMENT — PAIN SCALES - GENERAL
PAINLEVEL_OUTOF10: 0
PAINLEVEL_OUTOF10: 0
PAINLEVEL_OUTOF10: 5
PAINLEVEL_OUTOF10: 3
PAINLEVEL_OUTOF10: 3
PAINLEVEL_OUTOF10: 0

## 2025-08-05 ASSESSMENT — PAIN - FUNCTIONAL ASSESSMENT
PAIN_FUNCTIONAL_ASSESSMENT: ACTIVITIES ARE NOT PREVENTED
PAIN_FUNCTIONAL_ASSESSMENT: ACTIVITIES ARE NOT PREVENTED

## 2025-08-05 ASSESSMENT — PAIN DESCRIPTION - ORIENTATION
ORIENTATION: LEFT
ORIENTATION: LEFT

## 2025-08-05 ASSESSMENT — PAIN DESCRIPTION - LOCATION
LOCATION: INCISION
LOCATION: INCISION

## 2025-08-05 ASSESSMENT — PAIN DESCRIPTION - DESCRIPTORS
DESCRIPTORS: SORE
DESCRIPTORS: SORE

## 2025-08-05 ASSESSMENT — PAIN DESCRIPTION - PAIN TYPE
TYPE: SURGICAL PAIN
TYPE: SURGICAL PAIN

## 2025-08-06 ENCOUNTER — APPOINTMENT (OUTPATIENT)
Dept: ULTRASOUND IMAGING | Age: 80
DRG: 314 | End: 2025-08-06
Attending: INTERNAL MEDICINE
Payer: MEDICARE

## 2025-08-06 VITALS
OXYGEN SATURATION: 97 % | DIASTOLIC BLOOD PRESSURE: 64 MMHG | HEIGHT: 61 IN | SYSTOLIC BLOOD PRESSURE: 149 MMHG | WEIGHT: 220 LBS | BODY MASS INDEX: 41.54 KG/M2 | HEART RATE: 67 BPM | TEMPERATURE: 97.5 F | RESPIRATION RATE: 20 BRPM

## 2025-08-06 LAB
ANION GAP SERPL CALC-SCNC: 11 MMOL/L (ref 7–16)
BUN SERPL-MCNC: 52 MG/DL (ref 8–23)
CALCIUM SERPL-MCNC: 8.6 MG/DL (ref 8.8–10.2)
CHLORIDE SERPL-SCNC: 109 MMOL/L (ref 98–107)
CO2 SERPL-SCNC: 18 MMOL/L (ref 20–29)
CREAT SERPL-MCNC: 2.15 MG/DL (ref 0.6–1.1)
CRP SERPL-MCNC: 1.5 MG/DL (ref 0–0.4)
ERYTHROCYTE [DISTWIDTH] IN BLOOD BY AUTOMATED COUNT: 15.6 % (ref 11.9–14.6)
ERYTHROCYTE [SEDIMENTATION RATE] IN BLOOD: 19 MM/HR (ref 0–30)
GLUCOSE BLD STRIP.AUTO-MCNC: 113 MG/DL (ref 65–100)
GLUCOSE BLD STRIP.AUTO-MCNC: 91 MG/DL (ref 65–100)
GLUCOSE BLD STRIP.AUTO-MCNC: 99 MG/DL (ref 65–100)
GLUCOSE SERPL-MCNC: 93 MG/DL (ref 70–99)
HCT VFR BLD AUTO: 26.8 % (ref 35.8–46.3)
HGB BLD-MCNC: 8.5 G/DL (ref 11.7–15.4)
MCH RBC QN AUTO: 29.5 PG (ref 26.1–32.9)
MCHC RBC AUTO-ENTMCNC: 31.7 G/DL (ref 31.4–35)
MCV RBC AUTO: 93.1 FL (ref 82–102)
NRBC # BLD: 0.08 K/UL (ref 0–0.2)
PLATELET # BLD AUTO: 261 K/UL (ref 150–450)
PMV BLD AUTO: 9.7 FL (ref 9.4–12.3)
POTASSIUM SERPL-SCNC: 4.8 MMOL/L (ref 3.5–5.1)
RBC # BLD AUTO: 2.88 M/UL (ref 4.05–5.2)
SERVICE CMNT-IMP: ABNORMAL
SERVICE CMNT-IMP: NORMAL
SERVICE CMNT-IMP: NORMAL
SODIUM SERPL-SCNC: 137 MMOL/L (ref 136–145)
WBC # BLD AUTO: 12.7 K/UL (ref 4.3–11.1)

## 2025-08-06 PROCEDURE — 6370000000 HC RX 637 (ALT 250 FOR IP): Performed by: NURSE PRACTITIONER

## 2025-08-06 PROCEDURE — 2500000003 HC RX 250 WO HCPCS: Performed by: NURSE PRACTITIONER

## 2025-08-06 PROCEDURE — 94640 AIRWAY INHALATION TREATMENT: CPT

## 2025-08-06 PROCEDURE — 85652 RBC SED RATE AUTOMATED: CPT

## 2025-08-06 PROCEDURE — 94760 N-INVAS EAR/PLS OXIMETRY 1: CPT

## 2025-08-06 PROCEDURE — 80048 BASIC METABOLIC PNL TOTAL CA: CPT

## 2025-08-06 PROCEDURE — 36415 COLL VENOUS BLD VENIPUNCTURE: CPT

## 2025-08-06 PROCEDURE — 6360000002 HC RX W HCPCS: Performed by: INTERNAL MEDICINE

## 2025-08-06 PROCEDURE — 94660 CPAP INITIATION&MGMT: CPT

## 2025-08-06 PROCEDURE — 86140 C-REACTIVE PROTEIN: CPT

## 2025-08-06 PROCEDURE — 2580000003 HC RX 258: Performed by: STUDENT IN AN ORGANIZED HEALTH CARE EDUCATION/TRAINING PROGRAM

## 2025-08-06 PROCEDURE — 82962 GLUCOSE BLOOD TEST: CPT

## 2025-08-06 PROCEDURE — 85027 COMPLETE CBC AUTOMATED: CPT

## 2025-08-06 PROCEDURE — 6370000000 HC RX 637 (ALT 250 FOR IP): Performed by: STUDENT IN AN ORGANIZED HEALTH CARE EDUCATION/TRAINING PROGRAM

## 2025-08-06 RX ORDER — METOPROLOL SUCCINATE 25 MG/1
25 TABLET, EXTENDED RELEASE ORAL 2 TIMES DAILY
Status: DISCONTINUED | OUTPATIENT
Start: 2025-08-06 | End: 2025-08-06 | Stop reason: HOSPADM

## 2025-08-06 RX ORDER — 0.9 % SODIUM CHLORIDE 0.9 %
500 INTRAVENOUS SOLUTION INTRAVENOUS ONCE
Status: COMPLETED | OUTPATIENT
Start: 2025-08-06 | End: 2025-08-06

## 2025-08-06 RX ORDER — BUDESONIDE 0.25 MG/2ML
0.25 INHALANT ORAL
Status: DISCONTINUED | OUTPATIENT
Start: 2025-08-06 | End: 2025-08-06 | Stop reason: HOSPADM

## 2025-08-06 RX ORDER — ARFORMOTEROL TARTRATE 15 UG/2ML
15 SOLUTION RESPIRATORY (INHALATION)
Status: DISCONTINUED | OUTPATIENT
Start: 2025-08-06 | End: 2025-08-06 | Stop reason: HOSPADM

## 2025-08-06 RX ADMIN — EZETIMIBE 10 MG: 10 TABLET ORAL at 09:31

## 2025-08-06 RX ADMIN — BUDESONIDE 250 MCG: 0.25 SUSPENSION RESPIRATORY (INHALATION) at 07:53

## 2025-08-06 RX ADMIN — ALLOPURINOL 100 MG: 100 TABLET ORAL at 09:31

## 2025-08-06 RX ADMIN — SODIUM CHLORIDE, PRESERVATIVE FREE 10 ML: 5 INJECTION INTRAVENOUS at 09:30

## 2025-08-06 RX ADMIN — FERROUS SULFATE TAB 325 MG (65 MG ELEMENTAL FE) 325 MG: 325 (65 FE) TAB at 09:32

## 2025-08-06 RX ADMIN — DRONEDARONE 400 MG: 400 TABLET, FILM COATED ORAL at 09:31

## 2025-08-06 RX ADMIN — PANTOPRAZOLE SODIUM 40 MG: 40 TABLET, DELAYED RELEASE ORAL at 06:05

## 2025-08-06 RX ADMIN — CYANOCOBALAMIN TAB 1000 MCG 1000 MCG: 1000 TAB at 09:30

## 2025-08-06 RX ADMIN — SODIUM CHLORIDE 500 ML: 0.9 INJECTION, SOLUTION INTRAVENOUS at 09:42

## 2025-08-06 RX ADMIN — ARFORMOTEROL TARTRATE 15 MCG: 15 SOLUTION RESPIRATORY (INHALATION) at 07:53

## 2025-08-06 RX ADMIN — IPRATROPIUM BROMIDE 0.5 MG: 0.5 SOLUTION RESPIRATORY (INHALATION) at 07:53

## 2025-08-06 RX ADMIN — METOPROLOL SUCCINATE 25 MG: 25 TABLET, EXTENDED RELEASE ORAL at 09:32

## 2025-08-06 ASSESSMENT — PAIN SCALES - GENERAL
PAINLEVEL_OUTOF10: 0

## 2025-08-06 ASSESSMENT — PAIN SCALES - WONG BAKER
WONGBAKER_NUMERICALRESPONSE: NO HURT
WONGBAKER_NUMERICALRESPONSE: NO HURT

## 2025-08-07 LAB
FUNGAL CULT/SMEAR: NORMAL
FUNGUS SMEAR: NORMAL
SPECIMEN PROCESSING: NORMAL
SPECIMEN SOURCE: NORMAL
SPECIMEN SOURCE: NORMAL

## 2025-08-11 ENCOUNTER — OFFICE VISIT (OUTPATIENT)
Age: 80
End: 2025-08-11
Payer: MEDICARE

## 2025-08-11 VITALS
SYSTOLIC BLOOD PRESSURE: 171 MMHG | DIASTOLIC BLOOD PRESSURE: 84 MMHG | BODY MASS INDEX: 41.03 KG/M2 | WEIGHT: 209 LBS | HEIGHT: 60 IN | HEART RATE: 78 BPM

## 2025-08-11 DIAGNOSIS — I10 ESSENTIAL HYPERTENSION: ICD-10-CM

## 2025-08-11 DIAGNOSIS — Z95.2 HISTORY OF TRANSCATHETER AORTIC VALVE REPLACEMENT (TAVR): ICD-10-CM

## 2025-08-11 DIAGNOSIS — I31.39 PERICARDIAL EFFUSION: Primary | ICD-10-CM

## 2025-08-11 PROCEDURE — 1126F AMNT PAIN NOTED NONE PRSNT: CPT | Performed by: INTERNAL MEDICINE

## 2025-08-11 PROCEDURE — 1111F DSCHRG MED/CURRENT MED MERGE: CPT | Performed by: INTERNAL MEDICINE

## 2025-08-11 PROCEDURE — 3079F DIAST BP 80-89 MM HG: CPT | Performed by: INTERNAL MEDICINE

## 2025-08-11 PROCEDURE — 1160F RVW MEDS BY RX/DR IN RCRD: CPT | Performed by: INTERNAL MEDICINE

## 2025-08-11 PROCEDURE — 1090F PRES/ABSN URINE INCON ASSESS: CPT | Performed by: INTERNAL MEDICINE

## 2025-08-11 PROCEDURE — 1159F MED LIST DOCD IN RCRD: CPT | Performed by: INTERNAL MEDICINE

## 2025-08-11 PROCEDURE — 1123F ACP DISCUSS/DSCN MKR DOCD: CPT | Performed by: INTERNAL MEDICINE

## 2025-08-11 PROCEDURE — G8400 PT W/DXA NO RESULTS DOC: HCPCS | Performed by: INTERNAL MEDICINE

## 2025-08-11 PROCEDURE — 99214 OFFICE O/P EST MOD 30 MIN: CPT | Performed by: INTERNAL MEDICINE

## 2025-08-11 PROCEDURE — 1036F TOBACCO NON-USER: CPT | Performed by: INTERNAL MEDICINE

## 2025-08-11 PROCEDURE — G8417 CALC BMI ABV UP PARAM F/U: HCPCS | Performed by: INTERNAL MEDICINE

## 2025-08-11 PROCEDURE — G8427 DOCREV CUR MEDS BY ELIG CLIN: HCPCS | Performed by: INTERNAL MEDICINE

## 2025-08-11 PROCEDURE — 3077F SYST BP >= 140 MM HG: CPT | Performed by: INTERNAL MEDICINE

## 2025-08-11 ASSESSMENT — ENCOUNTER SYMPTOMS
ABDOMINAL PAIN: 0
ALLERGIC/IMMUNOLOGIC NEGATIVE: 1
CHEST TIGHTNESS: 0
PHOTOPHOBIA: 0
GASTROINTESTINAL NEGATIVE: 1
EYES NEGATIVE: 1
BACK PAIN: 0
SHORTNESS OF BREATH: 0
EYE PAIN: 0
RESPIRATORY NEGATIVE: 1

## 2025-08-27 ENCOUNTER — OFFICE VISIT (OUTPATIENT)
Age: 80
End: 2025-08-27
Payer: MEDICARE

## 2025-08-27 VITALS — SYSTOLIC BLOOD PRESSURE: 143 MMHG | DIASTOLIC BLOOD PRESSURE: 71 MMHG | HEART RATE: 72 BPM

## 2025-08-27 DIAGNOSIS — I48.0 PAF (PAROXYSMAL ATRIAL FIBRILLATION) (HCC): ICD-10-CM

## 2025-08-27 DIAGNOSIS — I31.39 PERICARDIAL EFFUSION: Primary | ICD-10-CM

## 2025-08-27 DIAGNOSIS — I10 ESSENTIAL HYPERTENSION: ICD-10-CM

## 2025-08-27 DIAGNOSIS — I35.0 AORTIC VALVE STENOSIS, SEVERE: ICD-10-CM

## 2025-08-27 PROCEDURE — 3077F SYST BP >= 140 MM HG: CPT | Performed by: INTERNAL MEDICINE

## 2025-08-27 PROCEDURE — 1090F PRES/ABSN URINE INCON ASSESS: CPT | Performed by: INTERNAL MEDICINE

## 2025-08-27 PROCEDURE — G8400 PT W/DXA NO RESULTS DOC: HCPCS | Performed by: INTERNAL MEDICINE

## 2025-08-27 PROCEDURE — 99214 OFFICE O/P EST MOD 30 MIN: CPT | Performed by: INTERNAL MEDICINE

## 2025-08-27 PROCEDURE — 1159F MED LIST DOCD IN RCRD: CPT | Performed by: INTERNAL MEDICINE

## 2025-08-27 PROCEDURE — 1123F ACP DISCUSS/DSCN MKR DOCD: CPT | Performed by: INTERNAL MEDICINE

## 2025-08-27 PROCEDURE — G8417 CALC BMI ABV UP PARAM F/U: HCPCS | Performed by: INTERNAL MEDICINE

## 2025-08-27 PROCEDURE — 1126F AMNT PAIN NOTED NONE PRSNT: CPT | Performed by: INTERNAL MEDICINE

## 2025-08-27 PROCEDURE — G8427 DOCREV CUR MEDS BY ELIG CLIN: HCPCS | Performed by: INTERNAL MEDICINE

## 2025-08-27 PROCEDURE — 1111F DSCHRG MED/CURRENT MED MERGE: CPT | Performed by: INTERNAL MEDICINE

## 2025-08-27 PROCEDURE — 1160F RVW MEDS BY RX/DR IN RCRD: CPT | Performed by: INTERNAL MEDICINE

## 2025-08-27 PROCEDURE — 3078F DIAST BP <80 MM HG: CPT | Performed by: INTERNAL MEDICINE

## 2025-08-27 PROCEDURE — 1036F TOBACCO NON-USER: CPT | Performed by: INTERNAL MEDICINE

## 2025-08-27 ASSESSMENT — ENCOUNTER SYMPTOMS
ALLERGIC/IMMUNOLOGIC NEGATIVE: 1
ABDOMINAL PAIN: 0
EYE PAIN: 0
BACK PAIN: 0
SHORTNESS OF BREATH: 0
EYES NEGATIVE: 1
CHEST TIGHTNESS: 0
RESPIRATORY NEGATIVE: 1
GASTROINTESTINAL NEGATIVE: 1
PHOTOPHOBIA: 0

## 2025-09-03 LAB
FUNGAL CULT/SMEAR: NORMAL
FUNGUS (MYCOLOGY) CULTURE: NORMAL
FUNGUS SMEAR: NORMAL
REFLEX TO ID: NORMAL
SPECIMEN PROCESSING: NORMAL
SPECIMEN SOURCE: NORMAL
SPECIMEN SOURCE: NORMAL

## (undated) DEVICE — SUTURE PERMAHAND SZ 2-0 L30IN NONABSORBABLE BLK L17MM BB K883H

## (undated) DEVICE — KIT INTRO 5FR L40CM DIA0.018IN NDL L7CM NIT TUNGSTEN MIC ANG

## (undated) DEVICE — GLIDESHEATH SLENDER STAINLESS STEEL KIT: Brand: GLIDESHEATH SLENDER

## (undated) DEVICE — GUIDEWIRE VASC L260CM DIA0.035IN TAPR L11CM FLPY TIP L4CM

## (undated) DEVICE — SOLUTION IRRIG 1000ML H2O PIC PLAS SHATTERPROOF CONTAINER

## (undated) DEVICE — PRESSURE MONITORING SET: Brand: TRUWAVE, VAMP PLUS

## (undated) DEVICE — KIT DRNGE W/ 100MLBULB JP

## (undated) DEVICE — TRUE™ DILATATION BALLOON VALVULOPLASTY CATHETER, 26 MM X 4.5 CM,110 CM CATHETER: Brand: TRUE DILATATION

## (undated) DEVICE — PERCLOSE™ PROSTYLE™ SUTURE-MEDIATED CLOSURE AND REPAIR SYSTEM: Brand: PERCLOSE™ PROSTYLE™

## (undated) DEVICE — GUIDEWIRE VASC L260CM DIA0.035IN RAD 3MM J TIP L7CM PTFE

## (undated) DEVICE — CATHETER ANGIO 5FR L100CM GRY S STL NYL JL3.5 3 SEG BRAID L

## (undated) DEVICE — 12 FOOT DISPOSABLE EXTENSION CABLE WITH SAFE CONNECT / SCREW-DOWN

## (undated) DEVICE — APPLICATOR MEDICATED 26 CC SOLUTION HI LT ORNG CHLORAPREP

## (undated) DEVICE — PINNACLE TIF INTRODUCER SHEATH: Brand: PINNACLE

## (undated) DEVICE — SUTURE PERMA-HAND SZ 0 L30IN NONABSORBABLE BLK L36MM CT-1 424H

## (undated) DEVICE — GOWN,ECLIPSE,POLYRNF,BRTHSLV,XL,30/CS: Brand: MEDLINE

## (undated) DEVICE — DRAPE TWL SURG 16X26IN BLU ORB04] ALLCARE INC]

## (undated) DEVICE — CATHETER ANGIO INFIN 038IN AMPLATZ 534545T

## (undated) DEVICE — Device

## (undated) DEVICE — TRAY THORCENT CATH 8FR 3ML CHLORAPREP NDL STRW FLTR FN TIP

## (undated) DEVICE — PINNACLE INTRODUCER SHEATH: Brand: PINNACLE

## (undated) DEVICE — CATHETER DIAG AD 6FR L100CM 0.038IN COR POLYUR INT MAMM

## (undated) DEVICE — BAND COMPR L24CM REG CLR PLAS HEMSTAT EXT HK AND LOOP RETEN

## (undated) DEVICE — CATHETER COR DIAG JUDKINS R 5.0 CRV 5FR 100CM 0 SIDE H

## (undated) DEVICE — PRO*PADZ SOLID GEL RADIOLUCENT MULTI-FUNCTION ELECTRODES - 1 PAIR: Brand: PRO PADZ

## (undated) DEVICE — MICROPUNCTURE INTRODUCER SET SILHOUETTE TRANSITIONLESS PUSH-PLUS DESIGN - STIFFENED CANNULA WITH STAINLESS STEEL WIRE GUIDE: Brand: MICROPUNCTURE

## (undated) DEVICE — SET ACCS CATH 5FR L10CM NDL 21GA L7CM GWIRE L40CM

## (undated) DEVICE — CATHETER COR DIAG PIGTAILS PIG 145 CRV 5FR 110CM 6 SIDE H

## (undated) DEVICE — GUIDEWIRE VASC 0.035 INX275 CM X SM CRV LUBRIGREEN SS SAFARI

## (undated) DEVICE — GUIDE NDL ST W/ 14 X 147CM TELESCOPICALLY FLD CIV FLX CVR

## (undated) DEVICE — COVER,TABLE,HEAVY DUTY,79"X110",STRL: Brand: MEDLINE

## (undated) DEVICE — PERICARDIOCENTESIS KIT HI FLO 0.035 IN 8.3 FRX40 CM PIG

## (undated) DEVICE — CATHETER VASC 6 FR DIAG PGTL W/ SIDE H COR 110 CM LEN W/OUT

## (undated) DEVICE — GUIDEWIRE VASC L150CM DIA0.035IN FLX TIP L7CM PTFE STR FIX